# Patient Record
Sex: MALE | Race: WHITE | NOT HISPANIC OR LATINO | Employment: FULL TIME | URBAN - METROPOLITAN AREA
[De-identification: names, ages, dates, MRNs, and addresses within clinical notes are randomized per-mention and may not be internally consistent; named-entity substitution may affect disease eponyms.]

---

## 2017-05-08 ENCOUNTER — OFFICE VISIT (OUTPATIENT)
Dept: FAMILY MEDICINE | Facility: OTHER | Age: 37
End: 2017-05-08
Payer: COMMERCIAL

## 2017-05-08 VITALS
HEIGHT: 73 IN | BODY MASS INDEX: 29.4 KG/M2 | DIASTOLIC BLOOD PRESSURE: 80 MMHG | WEIGHT: 221.8 LBS | HEART RATE: 72 BPM | SYSTOLIC BLOOD PRESSURE: 118 MMHG | RESPIRATION RATE: 18 BRPM | OXYGEN SATURATION: 98 % | TEMPERATURE: 98 F

## 2017-05-08 DIAGNOSIS — M67.972 DISORDER OF LEFT ACHILLES TENDON: ICD-10-CM

## 2017-05-08 DIAGNOSIS — J01.90 ACUTE BACTERIAL RHINOSINUSITIS: Primary | ICD-10-CM

## 2017-05-08 DIAGNOSIS — B96.89 ACUTE BACTERIAL RHINOSINUSITIS: Primary | ICD-10-CM

## 2017-05-08 PROCEDURE — 99214 OFFICE O/P EST MOD 30 MIN: CPT | Performed by: NURSE PRACTITIONER

## 2017-05-08 RX ORDER — AZITHROMYCIN 250 MG/1
TABLET, FILM COATED ORAL
Qty: 6 TABLET | Refills: 0 | Status: SHIPPED | OUTPATIENT
Start: 2017-05-08 | End: 2018-03-09

## 2017-05-08 NOTE — NURSING NOTE
"Chief Complaint   Patient presents with     URI     Cough       Initial /80 (BP Location: Right arm, Patient Position: Chair, Cuff Size: Adult Regular)  Pulse 72  Temp 98  F (36.7  C) (Oral)  Resp 18  Ht 6' 1.2\" (1.859 m)  Wt 221 lb 12.8 oz (100.6 kg)  SpO2 98%  BMI 29.1 kg/m2 Estimated body mass index is 29.1 kg/(m^2) as calculated from the following:    Height as of this encounter: 6' 1.2\" (1.859 m).    Weight as of this encounter: 221 lb 12.8 oz (100.6 kg).  Medication Reconciliation: complete    "

## 2017-05-08 NOTE — PATIENT INSTRUCTIONS
"Drink plenty of fluids and rest.  May use salt water gargles- about 8 oz warm water with about 1 teaspoon salt  Sucrets and Cepacol spray are over the counter medications that numb the throat.  Over the counter pain relievers such as tylenol or ibuprofen may be used as needed.   Honey lemon tea helps to soothe the throat. \"Throat Coat\" tea is soothing as well.  Increase humidity, may use vicks vapor rub.  Wash hands frequently and do not share beverages.  Please follow up with primary care provider if symptoms are not improving, worsening or new symptoms or for any adverse reactions to medications.     Thank you  Ofelia Montero CNP      Please contact Dr. Alonzo at [facility].   "

## 2017-05-08 NOTE — PROGRESS NOTES
SUBJECTIVE:                                                    Henry Aaron is a 36 year old male who presents to clinic today for the following health issues:    HPI    Acute Illness   Acute illness concerns: cold symptoms  Onset: last week     Fever: no    Chills/Sweats: no    Headache (location?): YES    Sinus Pressure:YES    Conjunctivitis:  YES    Ear Pain: no    Rhinorrhea: YES    Congestion: YES    Sore Throat: YES     Cough: YES-productive of green sputum (but is red/green color blind)    Wheeze: YES- starting to maybe     Decreased Appetite: no     Nausea: no     Vomiting: no     Diarrhea:  no     Dysuria/Freq.: no     Fatigue/Achiness: YES    Sick/Strep Exposure: YES- family at home      Therapies Tried and outcome: Dayquil   Would also like to discuss left heel/ankle pain. 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    ANKLE LEFT THREE OR MORE VIEWS 4/19/2016 10:22 AM      HISTORY: Six months of pain in distal Achilles tendon, feel calcified  in this region. No known injury. Pain in left ankle and joints of left  foot.     COMPARISON: None.     FINDINGS: There is focal soft tissue thickening in the region of the  Achilles tendon/musculotendinous junction concerning for Achilles  tendinopathy. This would be better evaluated with MRI as clinically  indicated. No fracture or malalignment. Joint spaces are grossly well  maintained.         IMPRESSION: Findings are concerning for Achilles tendinopathy. Further  evaluation with MRI may be helpful as clinically indicated.      ALIX DOSS MD        Problem list and histories reviewed & adjusted, as indicated.  Additional history: as documented    Labs reviewed in EPIC    ROS:  Constitutional, HEENT, cardiovascular, pulmonary, gi and gu systems are negative, except as otherwise noted.    OBJECTIVE:                          "                           /80 (BP Location: Right arm, Patient Position: Chair, Cuff Size: Adult Regular)  Pulse 72  Temp 98  F (36.7  C) (Oral)  Resp 18  Ht 6' 1.2\" (1.859 m)  Wt 221 lb 12.8 oz (100.6 kg)  SpO2 98%  BMI 29.1 kg/m2  Body mass index is 29.1 kg/(m^2).  GENERAL: alert, no distress and fatigued  EYES: Eyes grossly normal to inspection, PERRL and conjunctivae and sclerae normal  HENT: normal cephalic/atraumatic, ear canals and TM's normal, nasal mucosa edematous , oropharynx clear, oral mucous membranes moist, tonsillar erythema and sinuses: maxillary tenderness on bilateral  NECK: bilateral anterior cervical adenopathy, no asymmetry, masses, or scars and thyroid normal to palpation  RESP: lungs clear to auscultation - no rales, rhonchi or wheezes  CV: regular rate and rhythm, normal S1 S2, no S3 or S4, no murmur, click or rub, no peripheral edema and peripheral pulses strong  ABDOMEN: soft, nontender, no hepatosplenomegaly, no masses and bowel sounds normal  MS: no gross musculoskeletal defects noted, no edema  MS: normal gait, no edema, inflammation, painless ROM. Positive for tightness, palpable bump right lateral distal achilles.     Diagnostic Test Results:  none      ASSESSMENT/PLAN:                                                        1. Acute bacterial rhinosinusitis  Will continue home care and start abx given length of illness and worsening symptoms. Will take abx with yogurt to protect good bacteria in colon.     - azithromycin (ZITHROMAX) 250 MG tablet; Two tablets first day, then one tablet daily for four days.  Dispense: 6 tablet; Refill: 0    2. Disorder of left Achilles tendon  Discussed  plan including further evaluation with MRI as recommended by radiologist (see X-ray results above) and concerns for achilles tendinopathy placing him at increased risk of injury.  Treatment options include following up with orthopedic specialty, active release technique, and strengthening " of supporting muscles of ankle.   He will call insurance company to find out coverage as this is a concern for him. He may not opt to have MRI done due to cost.   Information given with chiropractic referral to specialist who does active release technique.   If he would like to see orthopedic specialty he will call clinic for referral as this is appropriate.   I will contact him with MRI results when available.   - MR Ankle Left w/o Contrast; Future  - CHIROPRACTIC REFERRAL    Will return to clinic if symptoms worsen or do not improve with treatment.     The patient indicates understanding of these issues and agrees with the plan.      See Patient Instructions    GRACE [facility]

## 2017-05-08 NOTE — MR AVS SNAPSHOT
"              After Visit Summary   5/8/2017    Henry Aaron    MRN: [id number]           Patient Information     Date Of Birth          1980        Visit Information        Provider Department      5/8/2017 8:30 AM Ofelia Montero APRN [facility]        Today's Diagnoses     Acute bacterial rhinosinusitis    -  1    Disorder of left Achilles tendon          Care Instructions    Drink plenty of fluids and rest.  May use salt water gargles- about 8 oz warm water with about 1 teaspoon salt  Sucrets and Cepacol spray are over the counter medications that numb the throat.  Over the counter pain relievers such as tylenol or ibuprofen may be used as needed.   Honey lemon tea helps to soothe the throat. \"Throat Coat\" tea is soothing as well.  Increase humidity, may use vicks vapor rub.  Wash hands frequently and do not share beverages.  Please follow up with primary care provider if symptoms are not improving, worsening or new symptoms or for any adverse reactions to medications.     Thank you  Ofelia Montero CNP      Please contact Dr. Alonzo at [facility].         Follow-ups after your visit        Additional Services     CHIROPRACTIC REFERRAL       Your provider has referred you to: Dr. Vishnu Alonzo [facility] for active release technique    Please be aware that coverage of these services is subject to the terms and limitations of your health insurance plan.  Call member services at your health plan with any benefit or coverage questions.      Please bring the following to your appointment:    >>   Any x-rays, CTs or MRIs which have been performed.  Contact the facility where they were done to arrange for  prior to your scheduled appointment.    >>   List of current medications   >>   This referral request   >>   Any documents/labs given to you for this referral                  Future tests that were ordered for you today     Open Future Orders        " "Priority Expected Expires Ordered    MR Ankle Left w/o Contrast Routine  5/8/2018 5/8/2017            Who to contact     If you have questions or need follow up information about today's clinic visit or your schedule please contact [facility] directly at [phone].  Normal or non-critical lab and imaging results will be communicated to you by MyChart, letter or phone within 4 business days after the clinic has received the results. If you do not hear from us within 7 days, please contact the clinic through [employer] or phone. If you have a critical or abnormal lab result, we will notify you by phone as soon as possible.  Submit refill requests through [employer] or call your pharmacy and they will forward the refill request to us. Please allow 3 business days for your refill to be completed.          Additional Information About Your Visit        MyChart Information     [employer] gives you secure access to your electronic health record. If you see a primary care provider, you can also send messages to your care team and make appointments. If you have questions, please call your primary care clinic.  If you do not have a primary care provider, please call [phone] and they will assist you.        Care EveryWhere ID     This is your Care EveryWhere ID. This could be used by other organizations to access your [place] medical records  [id number]        Your Vitals Were     Pulse Temperature Respirations Height Pulse Oximetry BMI (Body Mass Index)    72 98  F (36.7  C) (Oral) 18 6' 1.2\" (1.859 m) 98% 29.1 kg/m2       Blood Pressure from Last 3 Encounters:   05/08/17 118/80   04/19/16 126/88   11/12/14 122/60    Weight from Last 3 Encounters:   05/08/17 221 lb 12.8 oz (100.6 kg)   11/12/14 222 lb (100.7 kg)   11/26/13 221 lb (100.2 kg)              We Performed the Following     CHIROPRACTIC REFERRAL          Today's Medication Changes          These changes are accurate as of: 5/8/17  9:13 AM.  If you " have any questions, ask your nurse or doctor.               Start taking these medicines.        Dose/Directions    azithromycin 250 MG tablet   Commonly known as:  ZITHROMAX   Used for:  Acute bacterial rhinosinusitis   Started by:  Ofelia Montero APRN CNP        Two tablets first day, then one tablet daily for four days.   Quantity:  6 tablet   Refills:  0            Where to get your medicines      These medications were sent to [id number] - [facility] [address] [address] AT [facility] [address]  [address] [address], [facility] [address]     Phone:  [phone]     azithromycin 250 MG tablet                Primary Care Provider Office Phone # Fax #    GRACE Weston [phone]       [address]  [facility] [address]        Thank you!     Thank you for choosing [facility]  for your care. Our goal is always to provide you with excellent care. Hearing back from our patients is one way we can continue to improve our services. Please take a few minutes to complete the written survey that you may receive in the mail after your visit with us. Thank you!             Your Updated Medication List - Protect others around you: Learn how to safely use, store and throw away your medicines at www.disposemymeds.org.          This list is accurate as of: 5/8/17  9:13 AM.  Always use your most recent med list.                   Brand Name Dispense Instructions for use    azithromycin 250 MG tablet    ZITHROMAX    6 tablet    Two tablets first day, then one tablet daily for four days.

## 2018-02-05 ENCOUNTER — TRANSFERRED RECORDS (OUTPATIENT)
Dept: HEALTH INFORMATION MANAGEMENT | Facility: CLINIC | Age: 38
End: 2018-02-05

## 2018-02-05 LAB
ALT SERPL-CCNC: 143 U/L (ref 0–45)
AST SERPL-CCNC: 72 U/L (ref 0–41)
CHOLEST SERPL-MCNC: 204 MG/DL (ref 140–280)
CREAT SERPL-MCNC: 0.86 MG/DL (ref 0.6–1.5)
GLUCOSE SERPL-MCNC: 60 MG/DL (ref 70–109)
HBA1C MFR BLD: 5.2 % (ref 3–6)
HDLC SERPL-MCNC: 57.9 MG/DL (ref 25–75)
HEP C HIM: NORMAL
LDLC SERPL CALC-MCNC: 121 MG/DL (ref 80–200)
TRIGL SERPL-MCNC: 125 MG/DL (ref 10–200)

## 2018-03-06 NOTE — PATIENT INSTRUCTIONS
Preventive Health Recommendations  Male Ages 26 - 39    Yearly exam:             See your health care provider every year in order to  o   Review health changes.   o   Discuss preventive care.    o   Review your medicines if your doctor has prescribed any.    You should be tested each year for STDs (sexually transmitted diseases), if you re at risk.     After age 35, talk to your provider about cholesterol testing. If you are at risk for heart disease, have your cholesterol tested at least every 5 years.     If you are at risk for diabetes, you should have a diabetes test (fasting glucose).  Shots: Get a flu shot each year. Get a tetanus shot every 10 years.     Nutrition:    Eat at least 5 servings of fruits and vegetables daily.     Eat whole-grain bread, whole-wheat pasta and brown rice instead of white grains and rice.     Talk to your provider about Calcium and Vitamin D.     Lifestyle    Exercise for at least 150 minutes a week (30 minutes a day, 5 days a week). This will help you control your weight and prevent disease.     Limit alcohol to one drink per day.     No smoking.     Wear sunscreen to prevent skin cancer.     See your dentist every six months for an exam and cleaning.     These are new changes to your current plan of care based on today's visit:    Medications stopped    Medications to be started    Change dose of this medication    New treatments        Follow up appointments:    1.  FOLLOW UP WITH YOUR PRIMARY CARE PROVIDER: As needed    2. FOLLOW UP WITH SPECIALIST:     3. FOLLOW UP WITH NURSE VISIT:     4. IMAGING STUDIES TO BE SCHEDULED: Ultrasound of the liver    Vishnu Toribio MD

## 2018-03-06 NOTE — PROGRESS NOTES
SUBJECTIVE:   CC: Henry Aaron is an 37 year old male who presents for preventative health visit.     Physical   Annual:     Getting at least 3 servings of Calcium per day::  Yes    Bi-annual eye exam::  Yes    Dental care twice a year::  NO    Sleep apnea or symptoms of sleep apnea::  None    Diet::  Regular (no restrictions)    Frequency of exercise::  None    Taking medications regularly::  Not Applicable    Medication side effects::  Not applicable    Additional concerns today::  YES          1) Review outside lab results --- will need recent insurance application fasting blood studies abstracted into his chart.    This patient recently had fasting blood studies done on 2/8/18 for life insurance application.  He had normal findings except for an unanticipated elevation of AST of 72, ALT of 143 and GGTP of 113.  Bilirubin was not measured.  This was totally unexpected.  The patient had been an active alcohol user up until 3-4 years ago.  Prior to that he may have had abnormal liver enzymes from the amount of drinking he was doing.  He had 2 DUIs in the past.    He is feeling absolutely fine.  There is no symptoms of nausea, vomiting, stool changes, abdominal distention or fatigue.  He has had no yellowing of his eyes.    The screening study showed that he was nonreactive for hepatitis C and negative for hepatitis B antigen.  HIV was also nonreactive.  He had negative findings for cocaine and nicotine.  Blood sugars were normal.  His lipid panel were quite acceptable.    He does not use alcoholic beverages as mentioned.  No illicit drugs.  He does frequently use energy drinks (contents and additives uncertain.)  He does state that he drinks a good deal of diet sodas.  He takes no herbal medicines are nonprescription supplements and he has had no recent travel outside the [place].  Is on no regular medications.  He has not had any illnesses.              Today's PHQ-2 Score:   PHQ-2 ( 1999 Pf[employer]) 3/9/2018  "  Q1: Little interest or pleasure in doing things 0   Q2: Feeling down, depressed or hopeless 0   PHQ-2 Score 0   Q1: Little interest or pleasure in doing things Not at all   Q2: Feeling down, depressed or hopeless Not at all   PHQ-2 Score 0       Abuse: Current or Past(Physical, Sexual or Emotional)- No  Do you feel safe in your environment - Yes    Social History   Substance Use Topics     Smoking status: Former Smoker     Types: Cigarettes     Smokeless tobacco: Never Used      Comment: 0-5 cigs/day since 1998, on and off (has quit for up to three years at one point)     Alcohol use Yes      Comment: 5 drinks a week     No flowsheet data found.    Last PSA: No results found for: PSA    Reviewed orders with patient. Reviewed health maintenance and updated orders accordingly - Yes      Reviewed and updated as needed this visit by clinical staff  Tobacco  Allergies  Med Hx  Surg Hx  Fam Hx  Soc Hx        Reviewed and updated as needed this visit by Provider            Review of Systems  C: NEGATIVE for fever, chills, change in weight  I: NEGATIVE for worrisome rashes, moles or lesions  E: NEGATIVE for vision changes or irritation  ENT: NEGATIVE for ear, mouth and throat problems  R: NEGATIVE for significant cough or SOB  B: NEGATIVE for masses, tenderness or discharge  CV: NEGATIVE for chest pain, palpitations or peripheral edema  GI: NEGATIVE for nausea, abdominal pain, heartburn, or change in bowel habits   male: negative for dysuria, hematuria, decreased urinary stream, erectile dysfunction, urethral discharge  M: NEGATIVE for significant arthralgias or myalgia  N: NEGATIVE for weakness, dizziness or paresthesias  E: NEGATIVE for temperature intolerance, skin/hair changes  H: NEGATIVE for bleeding problems  P: NEGATIVE for changes in mood or affect    OBJECTIVE:   /84  Pulse 80  Temp 98.2  F (36.8  C) (Temporal)  Resp 14  Ht 6' 0.25\" (1.835 m)  Wt 214 lb (97.1 kg)  BMI 28.82 kg/m2    Physical " Exam  GENERAL: healthy, alert and no distress  EYES: Eyes grossly normal to inspection, PERRL and conjunctivae and sclerae normal  HENT: ear canals and TM's normal, nose and mouth without ulcers or lesions  NECK: no adenopathy, no asymmetry, masses, or scars and thyroid normal to palpation  RESP: lungs clear to auscultation - no rales, rhonchi or wheezes  BREAST: normal without masses, tenderness or nipple discharge and no palpable axillary masses or adenopathy  CV: regular rate and rhythm, normal S1 S2, no S3 or S4, no murmur, click or rub, no peripheral edema and peripheral pulses strong  ABDOMEN: soft, nontender, no hepatosplenomegaly, no masses and bowel sounds normal   (male): normal male genitalia without lesions or urethral discharge, no hernia  RECTAL: deferred  MS: no gross musculoskeletal defects noted, no edema  SKIN: no suspicious lesions or rashes  NEURO: Normal strength and tone, mentation intact and speech normal  PSYCH: mentation appears normal, affect normal/bright  LYMPH: no cervical, supraclavicular, axillary, or inguinal adenopathy    Results for orders placed or performed in visit on 03/09/18 (from the past 24 hour(s))   CBC with platelets   Result Value Ref Range    WBC 4.6 4.0 - 11.0 10e9/L    RBC Count 5.51 4.4 - 5.9 10e12/L    Hemoglobin 15.7 13.3 - 17.7 g/dL    Hematocrit 47.0 40.0 - 53.0 %    MCV 85 78 - 100 fl    MCH 28.5 26.5 - 33.0 pg    MCHC 33.4 31.5 - 36.5 g/dL    RDW 13.9 10.0 - 15.0 %    Platelet Count 230 150 - 450 10e9/L   Lipid panel reflex to direct LDL Fasting   Result Value Ref Range    Cholesterol 191 <200 mg/dL    Triglycerides 86 <150 mg/dL    HDL Cholesterol 64 >39 mg/dL    LDL Cholesterol Calculated 110 (H) <100 mg/dL    Non HDL Cholesterol 127 <130 mg/dL   Basic metabolic panel   Result Value Ref Range    Sodium 141 133 - 144 mmol/L    Potassium 4.5 3.4 - 5.3 mmol/L    Chloride 105 94 - 109 mmol/L    Carbon Dioxide 28 20 - 32 mmol/L    Anion Gap 8 3 - 14 mmol/L     Glucose 87 70 - 99 mg/dL    Urea Nitrogen 21 7 - 30 mg/dL    Creatinine 0.79 0.66 - 1.25 mg/dL    GFR Estimate >90 >60 mL/min/1.7m2    GFR Estimate If Black >90 >60 mL/min/1.7m2    Calcium 9.2 8.5 - 10.1 mg/dL   Hepatic panel (Albumin, ALT, AST, Bili, Alk Phos, TP)   Result Value Ref Range    Bilirubin Direct 0.1 0.0 - 0.2 mg/dL    Bilirubin Total 0.6 0.2 - 1.3 mg/dL    Albumin 4.1 3.4 - 5.0 g/dL    Protein Total 8.0 6.8 - 8.8 g/dL    Alkaline Phosphatase 69 40 - 150 U/L     (H) 0 - 70 U/L    AST 33 0 - 45 U/L   GGT   Result Value Ref Range     (H) 0 - 75 U/L     Us Abdomen Limited    Result Date: 3/9/2018  Right upper quadrant ultrasound Comparisons: 4/26/2010 HISTORY:    Elevated liver enzymes FINDINGS:    The liver measures a craniocaudal dimension of 14.6 cm. No focal liver lesion. Liver echogenicity is normal. The gallbladder is normal. There is no gallbladder wall thickening or pericholecystic fluid. No sonographic Joy's sign was elicited.  The diameter of the common bile duct measures 4mm. The pancreas is partially obscured but otherwise appears unremarkable. The aorta and IVC are visualized. The right kidney measures 12.8 cm long. No solid renal mass, stone or hydronephrosis.     IMPRESSION:    Normal right upper quadrant ultrasound. LELA RANDHAWA MD      ASSESSMENT/PLAN:   1. Routine general medical examination at a health care facility  General physical examination appears unremarkable and normal.  - OFFICE/OUTPT VISIT,EST,LEVL II    2. Abnormal liver enzymes  Etiology of the elevation uncertain.  His AST is now normalized and ALT is improved from 1 month ago but still elevated.  GGT still elevated past 2 times normal.  Limited ultrasound of the upper abdomen shows a normal-appearing liver with gallbladder being normal and bile ducts being normal.  2 additional tests for possible autoimmune hepatitis are pending.  Hepatitis C and B were both negative from his life insurance  "studies.  Will discuss with the patient and either refer for gastroenterology at this time but more than likely continue to monitor.  Likely have him discontinue energy drinks completely case there is some type of supplement that is potentially liver toxic.  Would follow-up blood studies in 2-3 weeks.  - CBC with platelets  - Basic metabolic panel  - Hepatic panel (Albumin, ALT, AST, Bili, Alk Phos, TP)  - GGT  - F Actin EIA with reflex  - Mitochondrial M2 Antibody IgG  - US Abdomen Limited; Future  - PREVENTIVE VISIT,EST,18-39    3. Hyperlipidemia LDL goal <160  Lipid panel is at goal and no intervention other than healthy diet recommendation.  - Lipid panel reflex to direct LDL Fasting    COUNSELING:   Reviewed preventive health counseling, as reflected in patient instructions  Special attention given to:        Regular exercise       Healthy diet/nutrition    BP Screening:   Last 3 BP Readings:    BP Readings from Last 3 Encounters:   03/09/18 120/84   05/08/17 118/80   04/19/16 126/88       The following was recommended to the patient:  Re-screen BP within a year and recommended lifestyle modifications     reports that he has quit smoking. His smoking use included Cigarettes. He has never used smokeless tobacco.    Estimated body mass index is 28.82 kg/(m^2) as calculated from the following:    Height as of this encounter: 6' 0.25\" (1.835 m).    Weight as of this encounter: 214 lb (97.1 kg).   Weight management plan: Discussed healthy diet and exercise guidelines and patient will follow up in 12 months in clinic to re-evaluate.    Counseling Resources:  ATP IV Guidelines  Pooled Cohorts Equation Calculator  FRAX Risk Assessment  ICSI Preventive Guidelines  Dietary Guidelines for Americans, 2010  USDA's MyPlate  ASA Prophylaxis  Lung CA Screening    Vishnu Toribio MD  [facility]  "

## 2018-03-09 ENCOUNTER — RADIANT APPOINTMENT (OUTPATIENT)
Dept: ULTRASOUND IMAGING | Facility: CLINIC | Age: 38
End: 2018-03-09
Attending: FAMILY MEDICINE
Payer: COMMERCIAL

## 2018-03-09 ENCOUNTER — OFFICE VISIT (OUTPATIENT)
Dept: FAMILY MEDICINE | Facility: CLINIC | Age: 38
End: 2018-03-09
Payer: COMMERCIAL

## 2018-03-09 VITALS
HEART RATE: 80 BPM | RESPIRATION RATE: 14 BRPM | HEIGHT: 72 IN | SYSTOLIC BLOOD PRESSURE: 120 MMHG | TEMPERATURE: 98.2 F | DIASTOLIC BLOOD PRESSURE: 84 MMHG | BODY MASS INDEX: 28.99 KG/M2 | WEIGHT: 214 LBS

## 2018-03-09 DIAGNOSIS — R74.8 ABNORMAL LIVER ENZYMES: ICD-10-CM

## 2018-03-09 DIAGNOSIS — E78.5 HYPERLIPIDEMIA LDL GOAL <160: ICD-10-CM

## 2018-03-09 DIAGNOSIS — Z00.00 ROUTINE GENERAL MEDICAL EXAMINATION AT A HEALTH CARE FACILITY: Primary | ICD-10-CM

## 2018-03-09 LAB
ALBUMIN SERPL-MCNC: 4.1 G/DL (ref 3.4–5)
ALP SERPL-CCNC: 69 U/L (ref 40–150)
ALT SERPL W P-5'-P-CCNC: 109 U/L (ref 0–70)
ANION GAP SERPL CALCULATED.3IONS-SCNC: 8 MMOL/L (ref 3–14)
AST SERPL W P-5'-P-CCNC: 33 U/L (ref 0–45)
BILIRUB DIRECT SERPL-MCNC: 0.1 MG/DL (ref 0–0.2)
BILIRUB SERPL-MCNC: 0.6 MG/DL (ref 0.2–1.3)
BUN SERPL-MCNC: 21 MG/DL (ref 7–30)
CALCIUM SERPL-MCNC: 9.2 MG/DL (ref 8.5–10.1)
CHLORIDE SERPL-SCNC: 105 MMOL/L (ref 94–109)
CHOLEST SERPL-MCNC: 191 MG/DL
CO2 SERPL-SCNC: 28 MMOL/L (ref 20–32)
CREAT SERPL-MCNC: 0.79 MG/DL (ref 0.66–1.25)
ERYTHROCYTE [DISTWIDTH] IN BLOOD BY AUTOMATED COUNT: 13.9 % (ref 10–15)
GFR SERPL CREATININE-BSD FRML MDRD: >90 ML/MIN/1.7M2
GGT SERPL-CCNC: 160 U/L (ref 0–75)
GLUCOSE SERPL-MCNC: 87 MG/DL (ref 70–99)
HCT VFR BLD AUTO: 47 % (ref 40–53)
HDLC SERPL-MCNC: 64 MG/DL
HGB BLD-MCNC: 15.7 G/DL (ref 13.3–17.7)
LDLC SERPL CALC-MCNC: 110 MG/DL
MCH RBC QN AUTO: 28.5 PG (ref 26.5–33)
MCHC RBC AUTO-ENTMCNC: 33.4 G/DL (ref 31.5–36.5)
MCV RBC AUTO: 85 FL (ref 78–100)
NONHDLC SERPL-MCNC: 127 MG/DL
PLATELET # BLD AUTO: 230 10E9/L (ref 150–450)
POTASSIUM SERPL-SCNC: 4.5 MMOL/L (ref 3.4–5.3)
PROT SERPL-MCNC: 8 G/DL (ref 6.8–8.8)
RBC # BLD AUTO: 5.51 10E12/L (ref 4.4–5.9)
SODIUM SERPL-SCNC: 141 MMOL/L (ref 133–144)
TRIGL SERPL-MCNC: 86 MG/DL
WBC # BLD AUTO: 4.6 10E9/L (ref 4–11)

## 2018-03-09 PROCEDURE — 99395 PREV VISIT EST AGE 18-39: CPT | Performed by: FAMILY MEDICINE

## 2018-03-09 PROCEDURE — 80076 HEPATIC FUNCTION PANEL: CPT | Performed by: FAMILY MEDICINE

## 2018-03-09 PROCEDURE — 83516 IMMUNOASSAY NONANTIBODY: CPT | Mod: 90 | Performed by: FAMILY MEDICINE

## 2018-03-09 PROCEDURE — 80048 BASIC METABOLIC PNL TOTAL CA: CPT | Performed by: FAMILY MEDICINE

## 2018-03-09 PROCEDURE — 76705 ECHO EXAM OF ABDOMEN: CPT

## 2018-03-09 PROCEDURE — 99000 SPECIMEN HANDLING OFFICE-LAB: CPT | Performed by: FAMILY MEDICINE

## 2018-03-09 PROCEDURE — 99213 OFFICE O/P EST LOW 20 MIN: CPT | Mod: 25 | Performed by: FAMILY MEDICINE

## 2018-03-09 PROCEDURE — 36415 COLL VENOUS BLD VENIPUNCTURE: CPT | Performed by: FAMILY MEDICINE

## 2018-03-09 PROCEDURE — 80061 LIPID PANEL: CPT | Performed by: FAMILY MEDICINE

## 2018-03-09 PROCEDURE — 83516 IMMUNOASSAY NONANTIBODY: CPT | Mod: 59 | Performed by: FAMILY MEDICINE

## 2018-03-09 PROCEDURE — 85027 COMPLETE CBC AUTOMATED: CPT | Performed by: FAMILY MEDICINE

## 2018-03-09 PROCEDURE — 82977 ASSAY OF GGT: CPT | Performed by: FAMILY MEDICINE

## 2018-03-09 ASSESSMENT — PAIN SCALES - GENERAL: PAINLEVEL: NO PAIN (0)

## 2018-03-09 NOTE — MR AVS SNAPSHOT
After Visit Summary   3/9/2018    Henry Aaron    MRN: [id number]           Patient Information     Date Of Birth          1980        Visit Information        Provider Department      3/9/2018 8:20 AM Vishnu Toribio MD [facility]        Today's Diagnoses     Routine general medical examination at a health care facility    -  1    Abnormal liver enzymes        Hyperlipidemia LDL goal <160          Care Instructions      Preventive Health Recommendations  Male Ages 26 - 39    Yearly exam:             See your health care provider every year in order to  o   Review health changes.   o   Discuss preventive care.    o   Review your medicines if your doctor has prescribed any.    You should be tested each year for STDs (sexually transmitted diseases), if you re at risk.     After age 35, talk to your provider about cholesterol testing. If you are at risk for heart disease, have your cholesterol tested at least every 5 years.     If you are at risk for diabetes, you should have a diabetes test (fasting glucose).  Shots: Get a flu shot each year. Get a tetanus shot every 10 years.     Nutrition:    Eat at least 5 servings of fruits and vegetables daily.     Eat whole-grain bread, whole-wheat pasta and brown rice instead of white grains and rice.     Talk to your provider about Calcium and Vitamin D.     Lifestyle    Exercise for at least 150 minutes a week (30 minutes a day, 5 days a week). This will help you control your weight and prevent disease.     Limit alcohol to one drink per day.     No smoking.     Wear sunscreen to prevent skin cancer.     See your dentist every six months for an exam and cleaning.     These are new changes to your current plan of care based on today's visit:    Medications stopped    Medications to be started    Change dose of this medication    New treatments        Follow up appointments:    1.  FOLLOW UP WITH YOUR PRIMARY CARE PROVIDER: As needed    2.  FOLLOW UP WITH SPECIALIST:     3. FOLLOW UP WITH NURSE VISIT:     4. IMAGING STUDIES TO BE SCHEDULED: Ultrasound of the liver    Vishnu Toribio MD                  Follow-ups after your visit        Your next 10 appointments already scheduled     Mar 09, 2018 12:45 PM CST   US ABDOMEN LIMITED with MGUS1, MG US TECH   [facility] ([facility])    [address] [place] [id number] [phone]           Please bring a list of your medicines (including vitamins, minerals and over-the-counter drugs). Also, tell your doctor about any allergies you may have. Wear comfortable clothes and leave your valuables at home.  Adults: No eating or drinking for 8 hours before the exam. You may take medicine with a small sip of water.  Children: - Children 6+ years: No food or drink for 6 hours before exam. - Children 1-5 years: No food or drink for 4 hours before exam. - Infants, breast-fed: may have breast milk up to 2 hours before exam. - Infants, formula: may have bottle until 4 hours before exam.  Please call the Imaging Department at your exam site with any questions.              Future tests that were ordered for you today     Open Future Orders        Priority Expected Expires Ordered    US Abdomen Limited Routine 3/9/2018 4/30/2018 3/9/2018            Who to contact     If you have questions or need follow up information about today's clinic visit or your schedule please contact [facility] (formerly [facility]) directly at [phone].  Normal or non-critical lab and imaging results will be communicated to you by MyChart, letter or phone within 4 business days after the clinic has received the results. If you do not hear from us within 7 days, please contact the clinic through MyChart or phone. If you have a critical or abnormal lab result, we will notify you by phone as soon as possible.  Submit refill requests through [employer] or call your pharmacy and they will forward the refill  "request to us. Please allow 3 business days for your refill to be completed.          Additional Information About Your Visit        [employer] Information     [employer] gives you secure access to your electronic health record. If you see a primary care provider, you can also send messages to your care team and make appointments. If you have questions, please call your primary care clinic.  If you do not have a primary care provider, please call [phone] and they will assist you.        Care EveryWhere ID     This is your Care EveryWhere ID. This could be used by other organizations to access your [facility] medical records  [id number]        Your Vitals Were     Pulse Temperature Respirations Height BMI (Body Mass Index)       80 98.2  F (36.8  C) (Temporal) 14 6' 0.25\" (1.835 m) 28.82 kg/m2        Blood Pressure from Last 3 Encounters:   03/09/18 120/84   05/08/17 118/80   04/19/16 126/88    Weight from Last 3 Encounters:   03/09/18 214 lb (97.1 kg)   05/08/17 221 lb 12.8 oz (100.6 kg)   11/12/14 222 lb (100.7 kg)              We Performed the Following     Basic metabolic panel     CBC with platelets     F Actin EIA with reflex     GGT     Hepatic panel (Albumin, ALT, AST, Bili, Alk Phos, TP)     Lipid panel reflex to direct LDL Fasting     Mitochondrial M2 Antibody IgG        Primary Care Provider Office Phone # Fax #    Ofelianael Harper Paul Montero, APRN [phone]       [address]  [facility] [address]        Equal Access to Services     JHON LAZO AH: Hadii aad ku hadasho Soomaali, waaxda luqadaha, qaybta kaalmada adeegyada, waxay clair ascencio . So [facility] [phone].    ATENCIÓN: Si habla español, tiene a lu disposición servicios gratuitos de asistencia lingüística. Llame al [phone].    We comply with applicable federal civil rights laws and [place] laws. We do not discriminate on the basis of race, color, national origin, age, disability, sex, sexual " orientation, or gender identity.            Thank you!     Thank you for choosing [facility]  for your care. Our goal is always to provide you with excellent care. Hearing back from our patients is one way we can continue to improve our services. Please take a few minutes to complete the written survey that you may receive in the mail after your visit with us. Thank you!             Your Updated Medication List - Protect others around you: Learn how to safely use, store and throw away your medicines at www.disposemymeds.org.      Notice  As of 3/9/2018  9:10 AM    You have not been prescribed any medications.

## 2018-03-10 LAB — MITOCHONDRIA M2 IGG SER-ACNC: 1 U/ML

## 2018-03-11 LAB — SMA IGG SER-ACNC: 12 UNITS (ref 0–19)

## 2018-04-11 ENCOUNTER — TELEPHONE (OUTPATIENT)
Dept: LAB | Facility: CLINIC | Age: 38
End: 2018-04-11

## 2018-04-11 DIAGNOSIS — R74.8 ABNORMAL LIVER ENZYMES: Primary | ICD-10-CM

## 2018-04-11 NOTE — TELEPHONE ENCOUNTER
peter is coming in for liver panel on 4/13/18  Please place orders as needed.  Thank you   Eunice CADENA) [facility]

## 2018-04-13 DIAGNOSIS — R74.8 ABNORMAL LIVER ENZYMES: ICD-10-CM

## 2018-04-13 LAB
ALBUMIN SERPL-MCNC: 3.9 G/DL (ref 3.4–5)
ALP SERPL-CCNC: 69 U/L (ref 40–150)
ALT SERPL W P-5'-P-CCNC: 109 U/L (ref 0–70)
AST SERPL W P-5'-P-CCNC: 37 U/L (ref 0–45)
BILIRUB DIRECT SERPL-MCNC: 0.1 MG/DL (ref 0–0.2)
BILIRUB SERPL-MCNC: 0.6 MG/DL (ref 0.2–1.3)
PROT SERPL-MCNC: 7.6 G/DL (ref 6.8–8.8)

## 2018-04-13 PROCEDURE — 80076 HEPATIC FUNCTION PANEL: CPT | Performed by: FAMILY MEDICINE

## 2018-04-13 PROCEDURE — 36415 COLL VENOUS BLD VENIPUNCTURE: CPT | Performed by: FAMILY MEDICINE

## 2018-04-16 ENCOUNTER — TELEPHONE (OUTPATIENT)
Dept: FAMILY MEDICINE | Facility: CLINIC | Age: 38
End: 2018-04-16

## 2018-04-16 DIAGNOSIS — R74.8 ABNORMAL LIVER ENZYMES: Primary | ICD-10-CM

## 2018-04-16 NOTE — TELEPHONE ENCOUNTER
The patient was called to discuss the repeat hepatic panel.  He still has an elevated ALT of 109, identical to 1 month ago.  AST remained stable and normal over the last 2 blood tests.    GGT which was elevated on his insurance application remain at a similar levels at 160.    His evaluation has included a normal liver ultrasound, hepatitis B and C were ruled out with his insurance physical as well as HIV, autoimmune hepatitis was screened for and was also normal.  Hemochromatosis was also evaluated and ruled out.    He has not had an alcoholic beverage for virtually 4 years or more.  He has avoided all energy drinks and unusual foods.  He did have some problem with alcohol but was a number of years ago and has no longer been using any alcoholic beverages.    He did have normal liver enzymes in September 2011.  Initial finding of the abnormal liver enzymes occurred during the insurance physical.    Plan at this time is for gastroenterology consultation.  Will suggest seeing .  Does not need to be urgent.    If his appointment does not occur within the next month, he will return for a fasting hepatic panel and GGT prior to the appointment.  If they return to normal he will be able to cancel that appointment.     to assist in scheduling.  The patient states that Mondays are better days for him for appointments.  To call him back with information on the appointment.    Records will need to be sent to the gastroenterologist to help outline the history and findings.    Vishnu Toribio MD  Please close encounter when call/work completed.

## 2018-04-16 NOTE — TELEPHONE ENCOUNTER
Dr. Neri's office requests OV notes, referral and labs be faxed to them at [phone] to review and they will call the patient to schedule the appt.    Noted on the fax cover sheet that they need to call pt asap and faxed info to them.    Pt informed to expect a call from their office.

## 2018-04-25 ENCOUNTER — TELEPHONE (OUTPATIENT)
Dept: FAMILY MEDICINE | Facility: CLINIC | Age: 38
End: 2018-04-25

## 2018-04-25 NOTE — TELEPHONE ENCOUNTER
Reason for Call:  Other family history     Detailed comments: Pt recently learned that he has a family history of Porphyria (sp?). He is wondering if that may be why he has the unexplained elevated liver enzymes? Please call.     Phone Number Patient can be reached at: Home number on file [phone] (home)    Best Time: any     Can we leave a detailed message on this number? YES    Call taken on 4/25/2018 at 1:34 PM by Theresa العراقي

## 2018-04-26 NOTE — TELEPHONE ENCOUNTER
Pt informed. Pt states he has not been contacted by Dr Neri's office yet. Gave him her contact information.    Ludy Haney, RN, BSN

## 2018-04-26 NOTE — TELEPHONE ENCOUNTER
After brief review of the literature, I would not expect a genetic background for porphyria to cause liver enzyme changes.  Is one had severe symptoms that possibly could occur.    Vishnu Toribio MD    Please close encounter when call/work completed.

## 2018-08-14 ENCOUNTER — TRANSFERRED RECORDS (OUTPATIENT)
Dept: HEALTH INFORMATION MANAGEMENT | Facility: CLINIC | Age: 38
End: 2018-08-14

## 2018-08-23 ENCOUNTER — TRANSFERRED RECORDS (OUTPATIENT)
Dept: HEALTH INFORMATION MANAGEMENT | Facility: CLINIC | Age: 38
End: 2018-08-23

## 2019-08-09 ENCOUNTER — APPOINTMENT (OUTPATIENT)
Dept: GENERAL RADIOLOGY | Facility: CLINIC | Age: 39
End: 2019-08-09
Attending: PHYSICIAN ASSISTANT
Payer: COMMERCIAL

## 2019-08-09 ENCOUNTER — HOSPITAL ENCOUNTER (EMERGENCY)
Facility: CLINIC | Age: 39
Discharge: HOME | End: 2019-08-09
Attending: PHYSICIAN ASSISTANT | Admitting: PHYSICIAN ASSISTANT
Payer: COMMERCIAL

## 2019-08-09 VITALS
OXYGEN SATURATION: 98 % | TEMPERATURE: 98.5 F | RESPIRATION RATE: 17 BRPM | BODY MASS INDEX: 29.82 KG/M2 | HEIGHT: 73 IN | HEART RATE: 93 BPM | WEIGHT: 225 LBS | SYSTOLIC BLOOD PRESSURE: 114 MMHG | DIASTOLIC BLOOD PRESSURE: 85 MMHG

## 2019-08-09 DIAGNOSIS — S61.209A: ICD-10-CM

## 2019-08-09 PROCEDURE — 64450 NJX AA&/STRD OTHER PN/BRANCH: CPT | Performed by: PHYSICIAN ASSISTANT

## 2019-08-09 PROCEDURE — 99284 EMERGENCY DEPT VISIT MOD MDM: CPT | Mod: 25 | Performed by: PHYSICIAN ASSISTANT

## 2019-08-09 PROCEDURE — 90471 IMMUNIZATION ADMIN: CPT | Performed by: PHYSICIAN ASSISTANT

## 2019-08-09 PROCEDURE — 99283 EMERGENCY DEPT VISIT LOW MDM: CPT | Mod: 25 | Performed by: PHYSICIAN ASSISTANT

## 2019-08-09 PROCEDURE — 73140 X-RAY EXAM OF FINGER(S): CPT | Mod: TC,RT

## 2019-08-09 PROCEDURE — 64450 NJX AA&/STRD OTHER PN/BRANCH: CPT | Mod: Z6 | Performed by: PHYSICIAN ASSISTANT

## 2019-08-09 PROCEDURE — 90715 TDAP VACCINE 7 YRS/> IM: CPT | Performed by: PHYSICIAN ASSISTANT

## 2019-08-09 PROCEDURE — 25000128 H RX IP 250 OP 636: Performed by: PHYSICIAN ASSISTANT

## 2019-08-09 RX ORDER — BUPIVACAINE HYDROCHLORIDE 5 MG/ML
1 INJECTION, SOLUTION EPIDURAL; INTRACAUDAL ONCE
Status: COMPLETED | OUTPATIENT
Start: 2019-08-09 | End: 2019-08-09

## 2019-08-09 RX ORDER — CEPHALEXIN 500 MG/1
500 CAPSULE ORAL 4 TIMES DAILY
Qty: 40 CAPSULE | Refills: 0 | Status: SHIPPED | OUTPATIENT
Start: 2019-08-09 | End: 2019-08-22

## 2019-08-09 RX ORDER — HYDROCODONE BITARTRATE AND ACETAMINOPHEN 5; 325 MG/1; MG/1
1 TABLET ORAL EVERY 6 HOURS PRN
Qty: 10 TABLET | Refills: 0 | Status: SHIPPED | OUTPATIENT
Start: 2019-08-09 | End: 2019-08-12

## 2019-08-09 RX ADMIN — CLOSTRIDIUM TETANI TOXOID ANTIGEN (FORMALDEHYDE INACTIVATED), CORYNEBACTERIUM DIPHTHERIAE TOXOID ANTIGEN (FORMALDEHYDE INACTIVATED), BORDETELLA PERTUSSIS TOXOID ANTIGEN (GLUTARALDEHYDE INACTIVATED), BORDETELLA PERTUSSIS FILAMENTOUS HEMAGGLUTININ ANTIGEN (FORMALDEHYDE INACTIVATED), BORDETELLA PERTUSSIS PERTACTIN ANTIGEN, AND BORDETELLA PERTUSSIS FIMBRIAE 2/3 ANTIGEN 0.5 ML: 5; 2; 2.5; 5; 3; 5 INJECTION, SUSPENSION INTRAMUSCULAR at 20:10

## 2019-08-09 RX ADMIN — BUPIVACAINE HYDROCHLORIDE 5 MG: 5 INJECTION, SOLUTION EPIDURAL; INTRACAUDAL at 20:45

## 2019-08-09 ASSESSMENT — MIFFLIN-ST. JEOR: SCORE: 1989.47

## 2019-08-09 NOTE — ED AVS SNAPSHOT
[facility] Emergency Department  [address] [place] [address]  Phone:  [phone]  Fax:  [phone]                                    Henry Aaron   MRN: [id number]    Department:  [facility] Emergency Department   Date of Visit:  8/9/2019           After Visit Summary Signature Page    I have received my discharge instructions, and my questions have been answered. I have discussed any challenges I see with this plan with the nurse or doctor.    ..........................................................................................................................................  Patient/Patient Representative Signature      ..........................................................................................................................................  Patient Representative Print Name and Relationship to Patient    ..................................................               ................................................  Date                                   Time    ..........................................................................................................................................  Reviewed by Signature/Title    ...................................................              ..............................................  Date                                               Time          22EPIC Rev 08/18

## 2019-08-10 NOTE — DISCHARGE INSTRUCTIONS
It was a pleasure working with you today!  I hope your condition improves rapidly!     Please keep the bandage on until orthopedics removes it to upon your recheck visit.  I spoke with Dr. Nelson, orthopedics, this evening regarding your finger.  He has clinic in [place] on Monday and [place] on Tuesday.  He said that he would work you into his schedule which ever day you would prefer.  Please call his office at [phone] to request an appointment.  Please elevate your finger is much as possible to decrease the amount of swelling, bleeding, and pain.  It is okay to use ibuprofen 600 mg every 6 hours as needed for discomfort.  Use hydrocodone for breakthrough pain not improved with the ibuprofen.  Please do not drive for 8 hours after using the hydrocodone, as it can impair your judgment.  Please take the antibiotic to prevent infection given that there is some bone exposure.

## 2019-08-10 NOTE — ED PROVIDER NOTES
History     Chief Complaint   Patient presents with     Laceration     HPI  Henry Aaron is a 39 year old right-hand dominant male who presents for evaluation of a degloving injury of his right index finger that occurred just prior to arrival.  He was wearing a pair of leather gloves and got his finger caught in the wood splitter.  Reports immediate onset of pain.  Bleeding controlled with pressure and a dressing.  He immediately presented to the ED.  Pain is rated 9 on a scale of 10 at this time.  Denies any alteration of his range of motion of the finger.  Last tetanus was in .    Allergies:  Allergies   Allergen Reactions     Augmentin Rash     Unclear if this true allergy or not. Rash appears like drug sensitivity rash but patient also [place] as starting augmentin. Advise avoidance if possible.      Sulfa Drugs        Problem List:    Patient Active Problem List    Diagnosis Date Noted     Abnormal liver enzymes 2018     Priority: Medium     Tinea corporis 2014     Priority: Medium     Folliculitis 2014     Priority: Medium     History of tobacco use      Priority: Medium      - 2011, was smoking up to 1 ppd       HYPERLIPIDEMIA LDL GOAL <160 10/31/2010     Priority: Medium        Past Medical History:    Past Medical History:   Diagnosis Date     History of tobacco use  - 2011     Hyperlipidemia LDL goal <160 10/31/2010       Past Surgical History:    Past Surgical History:   Procedure Laterality Date     HC TYMPANOPLASTY W/O MASTOID, INIT/REV W/O OSS CHAIN RECONST      bilateral     MYRINGOPLASTY  2011    Procedure:MYRINGOPLASTY; fat graft; Surgeon:HONORIO SIDDIQUI; Location:[facility] OR     TYMPANOPLASTY  Dec 2009    left ear       Family History:    Family History   Problem Relation Age of Onset     Heart Disease Maternal Grandfather          from heart failure at 75 y.o.     Cardiovascular Maternal Grandfather   "       heart failure     Neurologic Disorder Mother         MS, 40 y.o. at onset     Cancer Sister         breast cancer     Breast Cancer Sister      Asthma No family hx of      C.A.D. No family hx of      Diabetes No family hx of      Hypertension No family hx of      Cerebrovascular Disease No family hx of      Cancer - colorectal No family hx of      Prostate Cancer No family hx of      Arthritis No family hx of      Blood Disease No family hx of      Alzheimer Disease No family hx of      Circulatory No family hx of      Eye Disorder No family hx of      Gastrointestinal Disease No family hx of      Genitourinary Problems No family hx of      Lipids No family hx of      Thyroid Disease No family hx of      Respiratory No family hx of        Social History:  Marital Status:   [2]  Social History     Tobacco Use     Smoking status: Former Smoker     Types: Cigarettes     Smokeless tobacco: Never Used     Tobacco comment: 0-5 cigs/day since 1998, on and off (has quit for up to three years at one point)   Substance Use Topics     Alcohol use: No     Comment: Has not had any alcoholic beverages since 2014     Drug use: No        Medications:      cephALEXin (KEFLEX) 500 MG capsule   HYDROcodone-acetaminophen (NORCO) 5-325 MG tablet         Review of Systems   All other systems reviewed and are negative.      Physical Exam   BP: 112/86  Pulse: 93  Heart Rate: 76  Temp: 98.5  F (36.9  C)  Resp: 16  Height: 185.4 cm (6' 1\")  Weight: 102.1 kg (225 lb)  SpO2: 98 %      Physical Exam   Constitutional: He is oriented to person, place, and time. No distress.   HENT:   Head: Normocephalic and atraumatic.   Right Ear: External ear normal.   Left Ear: External ear normal.   Nose: Nose normal.   Mouth/Throat: Oropharynx is clear and moist. No oropharyngeal exudate.   Eyes: Pupils are equal, round, and reactive to light. Conjunctivae and EOM are normal. Right eye exhibits no discharge. Left eye exhibits no discharge. No " scleral icterus.   Neck: Normal range of motion. Neck supple. No thyromegaly present.   Cardiovascular: Normal rate, regular rhythm and normal heart sounds.   No murmur heard.  Pulmonary/Chest: Effort normal and breath sounds normal. No respiratory distress. He has no wheezes. He has no rales. He exhibits no tenderness.   Abdominal: Soft. Bowel sounds are normal. He exhibits no distension and no mass. There is no tenderness. There is no rebound and no guarding.   Musculoskeletal:   Right index finger, distal phalanx, with a degloving injury.  The complete nail plate has been evulsed including the distal tip of the finger.  Bone still appears to be intact.  See picture for details.  Patient has good range of motion at the DIP and PIP with intact strength.  No significant tendon involvement.  Sensation up to the lacerated area is intact.  Bleeding has been controlled with pressure.   Lymphadenopathy:     He has no cervical adenopathy.   Neurological: He is alert and oriented to person, place, and time. No cranial nerve deficit.   Skin: Skin is warm and dry. Capillary refill takes less than 2 seconds. No rash noted. He is not diaphoretic. No erythema.   Psychiatric: He has a normal mood and affect. His behavior is normal. Thought content normal.   Nursing note and vitals reviewed.              ED Course        Procedures               Critical Care time:  none               Results for orders placed or performed during the hospital encounter of 08/09/19 (from the past 24 hour(s))   XR Finger Right G/E 2 Views    Narrative    FINGER(S) TWO-THREE VIEWS RIGHT  8/9/2019 8:22 PM     HISTORY: right index finger distal phalanx degloving injury    COMPARISON: None.      Impression    IMPRESSION: Soft tissue amputation of the index finger distally. The  bones are preserved. No acute fracture. Normal joint spacing and  alignment. No radiopaque foreign body is identified. Mild soft tissue  swelling.    SHANICE DOHERTY MD        Medications   Tdap (tetanus-diphtheria-acell pertussis) (ADACEL) injection 0.5 mL (0.5 mLs Intramuscular Given 8/9/19 2010)   bupivacaine (MARCAINE) 0.5% preservative free injection (5 mg Intradermal Given by Other Clinician 8/9/19 2045)       Assessments & Plan (with Medical Decision Making)  Avulsion, finger tip     39 year old right-handed male who presents for evaluation of a degloving injury to his right distal index finger that occurred just prior to arrival.  Patient was using a wood splitter with wood gloves on when he pinched his finger and had immediate onset of pain.  Patient has lost one half of the distal phalanx fingertip on the right index finger with complete avulsion of the nail.  Upon arrival, a digital block with 0.5% bupivacaine without epinephrine was performed without complication.  Tetanus updated.  X-ray displays no evidence for fracture.  I consulted with orthopedics, Dr. Nelson, regarding the patient.  He reviewed the x-rays and also the ED pictures.  He recommended bacitracin ointment, Adaptic dressing, and tube gauze.  He agreed to see the patient on Monday or Tuesday of this week, 3 to 4 days from now.  The patient will not remove the dressing until then.  Dr. Nelson is hopeful that this will heal over time, but he will need to assess further to see if the bone does need to partially be taken down.  Discussed with patient that he should elevate the fingers much as possible.  Start antibiotics in the form of cephalexin 500 mg 4 times daily just in case he did catch a portion of the bone that was not caught on x-ray.  Ibuprofen 600 mg every 6 hours as needed for discomfort.  Hydrocodone for breakthrough pain.  No driving for 8 hours after taking this.  Indications for ED repeat evaluation prior to orthopedics recheck discussed with him.  Patient was in agreement.     I have reviewed the nursing notes.    I have reviewed the findings, diagnosis, plan and need for follow up with  the patient.       Discharge Medication List as of 8/9/2019  9:08 PM      START taking these medications    Details   cephALEXin (KEFLEX) 500 MG capsule Take 1 capsule (500 mg) by mouth 4 times daily for 10 days, Disp-40 capsule, R-0, E-Prescribe      HYDROcodone-acetaminophen (NORCO) 5-325 MG tablet Take 1 tablet by mouth every 6 hours as needed for severe pain, Disp-10 tablet, R-0, Local Print             Final diagnoses:   Avulsion, finger tip     Disclaimer: This note consists of symbols derived from keyboarding, dictation and/or voice recognition software. As a result, there may be errors in the script that have gone undetected. Please consider this when interpreting information found in this chart.      8/9/2019   Kevin Ag PA-C   [facility] EMERGENCY DEPARTMENT     Kevin Ag PA-C  08/10/19 0026

## 2019-08-10 NOTE — ED TRIAGE NOTES
Pt cut tip off of right pointer finger and nailbed in a wood splitter approx 45 minutes ago. Not actively bleeding at this time

## 2019-08-12 ENCOUNTER — OFFICE VISIT (OUTPATIENT)
Dept: ORTHOPEDICS | Facility: CLINIC | Age: 39
End: 2019-08-12
Payer: COMMERCIAL

## 2019-08-12 VITALS
BODY MASS INDEX: 29.82 KG/M2 | HEIGHT: 73 IN | HEART RATE: 60 BPM | DIASTOLIC BLOOD PRESSURE: 80 MMHG | SYSTOLIC BLOOD PRESSURE: 135 MMHG | WEIGHT: 225 LBS | OXYGEN SATURATION: 100 %

## 2019-08-12 DIAGNOSIS — S68.119A TRAUMATIC AMPUTATION OF FINGERTIP, INITIAL ENCOUNTER: ICD-10-CM

## 2019-08-12 PROCEDURE — 99203 OFFICE O/P NEW LOW 30 MIN: CPT | Performed by: ORTHOPAEDIC SURGERY

## 2019-08-12 RX ORDER — HYDROCODONE BITARTRATE AND ACETAMINOPHEN 5; 325 MG/1; MG/1
1 TABLET ORAL EVERY 6 HOURS PRN
Qty: 15 TABLET | Refills: 0 | Status: SHIPPED | OUTPATIENT
Start: 2019-08-12 | End: 2022-12-13

## 2019-08-12 ASSESSMENT — MIFFLIN-ST. JEOR: SCORE: 1989.47

## 2019-08-12 NOTE — LETTER
2019         RE: Henry Aaron  [address] [facility] [address]        Dear Colleague,    Thank you for referring your patient, Henry Aaron, to the [facility]. Please see a copy of my visit note below.    Henry Aaron is a 39 year old male who is seen in emergency room follow-up for right index fingertip amputation.  He sustained this on 2019 when he caught his right index finger caught in a log splitter on his driveway.  He cut off the tip of the index finger.  The nail is gone.  The bone is flush with the tip of the remaining soft tissue.  He was seen in the emergency room and cleaned and bandaged.  He is seen now for follow-up.  He has some shooting pain rated 1 out of 10 at rest.  More pain if it is bumped.  He is right-hand dominant.  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    Past Medical History:   Diagnosis Date     History of tobacco use  -  - 2011, was smoking up to 1 ppd     Hyperlipidemia LDL goal <160 10/31/2010       Past Surgical History:   Procedure Laterality Date     HC TYMPANOPLASTY W/O MASTOID, INIT/REV W/O OSS CHAIN RECONST      bilateral     MYRINGOPLASTY  2011    Procedure:MYRINGOPLASTY; fat graft; Surgeon:HONORIO SIDDIQUI; Location:[facility] OR     TYMPANOPLASTY  Dec 2009    left ear       Family History   Problem Relation Age of Onset     Heart Disease Maternal Grandfather          from heart failure at 75 y.o.     Cardiovascular Maternal Grandfather         heart failure     Neurologic Disorder Mother         MS, 40 y.o. at onset     Cancer Sister         breast cancer     Breast Cancer Sister      Asthma No family hx of      C.A.D. No family hx of      Diabetes No family hx of      Hypertension No family hx of      Cerebrovascular Disease No family hx of      Cancer - colorectal No family hx of      Prostate Cancer No family hx of      Arthritis No family hx of      Blood Disease No family hx of      Alzheimer Disease No  family hx of      Circulatory No family hx of      Eye Disorder No family hx of      Gastrointestinal Disease No family hx of      Genitourinary Problems No family hx of      Lipids No family hx of      Thyroid Disease No family hx of      Respiratory No family hx of        Social History     Socioeconomic History     Marital status:      Spouse name: Not on file     Number of children: Not on file     Years of education: Not on file     Highest education level: Not on file   Occupational History     Not on file   Social Needs     Financial resource strain: Not on file     Food insecurity:     Worry: Not on file     Inability: Not on file     Transportation needs:     Medical: Not on file     Non-medical: Not on file   Tobacco Use     Smoking status: Former Smoker     Types: Cigarettes     Smokeless tobacco: Never Used     Tobacco comment: 0-5 cigs/day since 1998, on and off (has quit for up to three years at one point)   Substance and Sexual Activity     Alcohol use: No     Comment: Has not had any alcoholic beverages since 2014     Drug use: No     Sexual activity: Yes     Partners: Female   Lifestyle     Physical activity:     Days per week: Not on file     Minutes per session: Not on file     Stress: Not on file   Relationships     Social connections:     Talks on phone: Not on file     Gets together: Not on file     Attends Hindu service: Not on file     Active member of club or organization: Not on file     Attends meetings of clubs or organizations: Not on file     Relationship status: Not on file     Intimate partner violence:     Fear of current or ex partner: Not on file     Emotionally abused: Not on file     Physically abused: Not on file     Forced sexual activity: Not on file   Other Topics Concern     Parent/sibling w/ CABG, MI or angioplasty before 65F 55M? Not Asked   Social History Narrative     Not on file       Current Outpatient Medications   Medication Sig Dispense Refill      "cephALEXin (KEFLEX) 500 MG capsule Take 1 capsule (500 mg) by mouth 4 times daily for 10 days 40 capsule 0     HYDROcodone-acetaminophen (NORCO) 5-325 MG tablet Take 1 tablet by mouth every 6 hours as needed for severe pain 15 tablet 0       Allergies   Allergen Reactions     Augmentin Rash     Unclear if this true allergy or not. Rash appears like drug sensitivity rash but patient [place] as starting augmentin. Advise avoidance if possible.      Sulfa Drugs        REVIEW OF SYSTEMS:  CONSTITUTIONAL:  NEGATIVE for fever, chills, change in weight, not feeling tired  SKIN:  NEGATIVE for worrisome rashes, no skin lumps, no skin ulcers and no non-healing wounds  EYES:  NEGATIVE for vision changes or irritation.  ENT/MOUTH:  NEGATIVE.  No hearing loss, no hoarseness, no difficulty swallowing.  RESP:  NEGATIVE. No cough or shortness of breath.  CV:  NEGATIVE for chest pain, palpitations or peripheral edema  GI:  NEGATIVE for nausea, abdominal pain, heartburn, or change in bowel habits  :  Negative. No dysuria, no hematuria  MUSCULOSKELETAL:  See HPI above  NEURO:  NEGATIVE . No headaches, no dizziness,  no numbness  ENDOCRINE:  NEGATIVE for temperature intolerance, skin/hair changes  HEME/ALLERGY/IMMUNE:  NEGATIVE for bleeding problems  PSYCHIATRIC:  NEGATIVE. no anxiety, no depression.     Exam:  Vitals: /80 (BP Location: Left arm, Patient Position: Sitting, Cuff Size: Adult Large)   Pulse 60   Ht 1.854 m (6' 1\")   Wt 102.1 kg (225 lb)   SpO2 100%   BMI 29.69 kg/m     BMI= Body mass index is 29.69 kg/m .  Constitutional:  healthy, alert and no distress  Neuro: Alert and Oriented x 3, Sensation grossly WNL.  Psych: Affect normal   Respiratory: Breathing not labored.  Cardiovascular: normal peripheral pulses  Lymph: no adenopathy  Skin: No rashes,worrisome lesions or skin problems  Dressing was removed from the finger.  The fingertip is clean.  He does have a " fair amount of the nailbed intact.  The nail itself is gone.  No sign of infection.    Assessment: Right index fingertip amputation.  There is no sign of infection.    Plan: New dressing was applied with bacitracin, Adaptic, gauze, tube gauze.  Return to clinic Thursday for dressing change again.  Finish Keflex 5-day course.    Again, thank you for allowing me to participate in the care of your patient.        Sincerely,        Bob Nelson MD

## 2019-08-13 NOTE — PROGRESS NOTES
Henry Aaron is a 39 year old male who is seen in emergency room follow-up for right index fingertip amputation.  He sustained this on 2019 when he caught his right index finger caught in a log splitter on his driveway.  He cut off the tip of the index finger.  The nail is gone.  The bone is flush with the tip of the remaining soft tissue.  He was seen in the emergency room and cleaned and bandaged.  He is seen now for follow-up.  He has some shooting pain rated 1 out of 10 at rest.  More pain if it is bumped.  He is right-hand dominant.  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    Past Medical History:   Diagnosis Date     History of tobacco use  -  - 2011, was smoking up to 1 ppd     Hyperlipidemia LDL goal <160 10/31/2010       Past Surgical History:   Procedure Laterality Date     HC TYMPANOPLASTY W/O MASTOID, INIT/REV W/O OSS CHAIN RECONST      bilateral     MYRINGOPLASTY  2011    Procedure:MYRINGOPLASTY; fat graft; Surgeon:HONORIO SIDDIQUI; Location:[facility]     TYMPANOPLASTY  Dec 2009    left ear       Family History   Problem Relation Age of Onset     Heart Disease Maternal Grandfather          from heart failure at 75 y.o.     Cardiovascular Maternal Grandfather         heart failure     Neurologic Disorder Mother         MS, 40 y.o. at onset     Cancer Sister         breast cancer     Breast Cancer Sister      Asthma No family hx of      C.A.D. No family hx of      Diabetes No family hx of      Hypertension No family hx of      Cerebrovascular Disease No family hx of      Cancer - colorectal No family hx of      Prostate Cancer No family hx of      Arthritis No family hx of      Blood Disease No family hx of      Alzheimer Disease No family hx of      Circulatory No family hx of      Eye Disorder No family hx of      Gastrointestinal Disease No family hx of      Genitourinary Problems No family hx of      Lipids No family hx of      Thyroid Disease No family hx of       Respiratory No family hx of        Social History     Socioeconomic History     Marital status:      Spouse name: Not on file     Number of children: Not on file     Years of education: Not on file     Highest education level: Not on file   Occupational History     Not on file   Social Needs     Financial resource strain: Not on file     Food insecurity:     Worry: Not on file     Inability: Not on file     Transportation needs:     Medical: Not on file     Non-medical: Not on file   Tobacco Use     Smoking status: Former Smoker     Types: Cigarettes     Smokeless tobacco: Never Used     Tobacco comment: 0-5 cigs/day since 1998, on and off (has quit for up to three years at one point)   Substance and Sexual Activity     Alcohol use: No     Comment: Has not had any alcoholic beverages since 2014     Drug use: No     Sexual activity: Yes     Partners: Female   Lifestyle     Physical activity:     Days per week: Not on file     Minutes per session: Not on file     Stress: Not on file   Relationships     Social connections:     Talks on phone: Not on file     Gets together: Not on file     Attends Holiness service: Not on file     Active member of club or organization: Not on file     Attends meetings of clubs or organizations: Not on file     Relationship status: Not on file     Intimate partner violence:     Fear of current or ex partner: Not on file     Emotionally abused: Not on file     Physically abused: Not on file     Forced sexual activity: Not on file   Other Topics Concern     Parent/sibling w/ CABG, MI or angioplasty before 65F 55M? Not Asked   Social History Narrative     Not on file       Current Outpatient Medications   Medication Sig Dispense Refill     cephALEXin (KEFLEX) 500 MG capsule Take 1 capsule (500 mg) by mouth 4 times daily for 10 days 40 capsule 0     HYDROcodone-acetaminophen (NORCO) 5-325 MG tablet Take 1 tablet by mouth every 6 hours as needed for severe pain 15 tablet 0  "      Allergies   Allergen Reactions     Augmentin Rash     Unclear if this true allergy or not. Rash appears like drug sensitivity rash but patient also [place] as starting augmentin. Advise avoidance if possible.      Sulfa Drugs        REVIEW OF SYSTEMS:  CONSTITUTIONAL:  NEGATIVE for fever, chills, change in weight, not feeling tired  SKIN:  NEGATIVE for worrisome rashes, no skin lumps, no skin ulcers and no non-healing wounds  EYES:  NEGATIVE for vision changes or irritation.  ENT/MOUTH:  NEGATIVE.  No hearing loss, no hoarseness, no difficulty swallowing.  RESP:  NEGATIVE. No cough or shortness of breath.  CV:  NEGATIVE for chest pain, palpitations or peripheral edema  GI:  NEGATIVE for nausea, abdominal pain, heartburn, or change in bowel habits  :  Negative. No dysuria, no hematuria  MUSCULOSKELETAL:  See HPI above  NEURO:  NEGATIVE . No headaches, no dizziness,  no numbness  ENDOCRINE:  NEGATIVE for temperature intolerance, skin/hair changes  HEME/ALLERGY/IMMUNE:  NEGATIVE for bleeding problems  PSYCHIATRIC:  NEGATIVE. no anxiety, no depression.     Exam:  Vitals: /80 (BP Location: Left arm, Patient Position: Sitting, Cuff Size: Adult Large)   Pulse 60   Ht 1.854 m (6' 1\")   Wt 102.1 kg (225 lb)   SpO2 100%   BMI 29.69 kg/m    BMI= Body mass index is 29.69 kg/m .  Constitutional:  healthy, alert and no distress  Neuro: Alert and Oriented x 3, Sensation grossly WNL.  Psych: Affect normal   Respiratory: Breathing not labored.  Cardiovascular: normal peripheral pulses  Lymph: no adenopathy  Skin: No rashes,worrisome lesions or skin problems  Dressing was removed from the finger.  The fingertip is clean.  He does have a fair amount of the nailbed intact.  The nail itself is gone.  No sign of infection.    Assessment: Right index fingertip amputation.  There is no sign of infection.    Plan: New dressing was applied with bacitracin, Adaptic, gauze, tube " gauze.  Return to clinic Thursday for dressing change again.  Finish Keflex 5-day course.

## 2019-08-15 ENCOUNTER — OFFICE VISIT (OUTPATIENT)
Dept: ORTHOPEDICS | Facility: OTHER | Age: 39
End: 2019-08-15
Payer: COMMERCIAL

## 2019-08-15 ENCOUNTER — OFFICE VISIT (OUTPATIENT)
Dept: UROLOGY | Facility: OTHER | Age: 39
End: 2019-08-15
Payer: COMMERCIAL

## 2019-08-15 VITALS
SYSTOLIC BLOOD PRESSURE: 128 MMHG | RESPIRATION RATE: 16 BRPM | HEART RATE: 67 BPM | WEIGHT: 225 LBS | HEIGHT: 73 IN | BODY MASS INDEX: 29.82 KG/M2 | DIASTOLIC BLOOD PRESSURE: 86 MMHG

## 2019-08-15 DIAGNOSIS — Z30.09 VASECTOMY EVALUATION: Primary | ICD-10-CM

## 2019-08-15 DIAGNOSIS — S68.119D TRAUMATIC AMPUTATION OF FINGERTIP, SUBSEQUENT ENCOUNTER: Primary | ICD-10-CM

## 2019-08-15 PROCEDURE — 99213 OFFICE O/P EST LOW 20 MIN: CPT | Performed by: ORTHOPAEDIC SURGERY

## 2019-08-15 PROCEDURE — 99202 OFFICE O/P NEW SF 15 MIN: CPT | Performed by: UROLOGY

## 2019-08-15 ASSESSMENT — MIFFLIN-ST. JEOR: SCORE: 1989.47

## 2019-08-15 NOTE — PROGRESS NOTES
Vasectomy Consult    Reason for visit:   Discuss as a method of birth control/sterilization.  He is interested in vasectomy scrotally.  Patient has 3 children and desires sterilization.  Does not want to use condom or having partners on birth control pills.  He has no erections problem.  He has no urinary complaints.    Current Outpatient Medications   Medication Sig Dispense Refill     cephALEXin (KEFLEX) 500 MG capsule Take 1 capsule (500 mg) by mouth 4 times daily for 10 days 40 capsule 0     HYDROcodone-acetaminophen (NORCO) 5-325 MG tablet Take 1 tablet by mouth every 6 hours as needed for severe pain 15 tablet 0     Allergies   Allergen Reactions     Augmentin Rash     Unclear if this true allergy or not. Rash appears like drug sensitivity rash but patient also [place] same [place] as starting augmentin. Advise avoidance if possible.      Sulfa Drugs      Past Medical History:   Diagnosis Date     History of tobacco use  -  - 2011, was smoking up to 1 ppd     Hyperlipidemia LDL goal <160 10/31/2010     Past Surgical History:   Procedure Laterality Date      TYMPANOPLASTY W/O MASTOID, INIT/REV W/O OSS CHAIN RECONST      bilateral     MYRINGOPLASTY  2011    Procedure:MYRINGOPLASTY; fat graft; Surgeon:HONORIO SIDDIQUI; Location:[facility]     TYMPANOPLASTY  Dec 2009    left ear      Family History   Problem Relation Age of Onset     Heart Disease Maternal Grandfather          from heart failure at 75 y.o.     Cardiovascular Maternal Grandfather         heart failure     Neurologic Disorder Mother         MS, 40 y.o. at onset     Cancer Sister         breast cancer     Breast Cancer Sister      Asthma No family hx of      C.A.D. No family hx of      Diabetes No family hx of      Hypertension No family hx of      Cerebrovascular Disease No family hx of      Cancer - colorectal No family hx of      Prostate Cancer No family hx of      Arthritis No  family hx of      Blood Disease No family hx of      Alzheimer Disease No family hx of      Circulatory No family hx of      Eye Disorder No family hx of      Gastrointestinal Disease No family hx of      Genitourinary Problems No family hx of      Lipids No family hx of      Thyroid Disease No family hx of      Respiratory No family hx of      Social History     Socioeconomic History     Marital status:      Spouse name: Not on file     Number of children: Not on file     Years of education: Not on file     Highest education level: Not on file   Occupational History     Not on file   Social Needs     Financial resource strain: Not on file     Food insecurity:     Worry: Not on file     Inability: Not on file     Transportation needs:     Medical: Not on file     Non-medical: Not on file   Tobacco Use     Smoking status: Former Smoker     Types: Cigarettes     Smokeless tobacco: Never Used     Tobacco comment: 0-5 cigs/day since 1998, on and off (has quit for up to three years at one point)   Substance and Sexual Activity     Alcohol use: No     Comment: Has not had any alcoholic beverages since 2014     Drug use: No     Sexual activity: Yes     Partners: Female   Lifestyle     Physical activity:     Days per week: Not on file     Minutes per session: Not on file     Stress: Not on file   Relationships     Social connections:     Talks on phone: Not on file     Gets together: Not on file     Attends Voodoo service: Not on file     Active member of club or organization: Not on file     Attends meetings of clubs or organizations: Not on file     Relationship status: Not on file     Intimate partner violence:     Fear of current or ex partner: Not on file     Emotionally abused: Not on file     Physically abused: Not on file     Forced sexual activity: Not on file   Other Topics Concern     Parent/sibling w/ CABG, MI or angioplasty before 65F 55M? Not Asked   Social History Narrative     Not on file        REVIEW OF SYSTEMS  =================  C: NEGATIVE for fever, chills, change in weight  I: NEGATIVE for worrisome rashes, moles or lesions  E/M: NEGATIVE for ear, mouth and throat problems  R: NEGATIVE for significant cough or SHORTNESS OF BREATH  CV:  NEGATIVE for chest pain, palpitations or peripheral edema  GI: NEGATIVE for nausea, abdominal pain, heartburn, or change in bowel habits  NEURO: NEGATIVE numbness/weakness  : see HPI  PSYCH: NEGATIVE depression/anxiety  LYmph: no new enlarged lymph nodes  Ortho: no new trauma/movements      Physical Exam:  BP (P) 138/85 (BP Location: Right arm, Patient Position: Chair, Cuff Size: Adult Regular)   Pulse (P) 56   Temp (P) 97.7  F (36.5  C)   SpO2 (P) 100%    Patient is pleasant, in no acute distress, good general condition.  HEENT:  Normalcephalic, atraumatic  Lung: no evidence of respiratory distress    Abdomen: Soft, nondistended, non tender. No masses. No rebound or guarding.   Exam: penis no d/c testis no masses bilateral vas palpated no scrotal lesion  Skin: Warm and dry.  No redness.  Neuro: grossly normal  Psych normal mood and affect  Musculoskeletal  moving all extremities        Discussed    That vasectomy is permanent method of birth control.    That vasectomy can fail due to recanalization of the vas even many months/years later.    That he needs 2 negative sperm checks to be considered sterile    That there are other methods that are not permanent and also that the sperm can be frozen for later use.    How the technique is performed, risks of infection, bleeding, damage to the testes vessels and testes atrophy    Long term complication such as chronic and difficult to treat testes pain and questionable increase incidence of prostate cancer    That the procedure can be done at the clinic or hospital OR        Plan:    Stop Aspirin  Will schedule Vasectomy in the future

## 2019-08-15 NOTE — PROGRESS NOTES
"HISTORY OF PRESENT ILLNESS:    Henry Aaron is a 39 year old male who is seen in follow up for   Chief Complaint   Patient presents with     RECHECK     Right index finger amputation on 8/9/19.   Present symptoms:  Patient is 6 days s/p injury.  He reports good pain control.  He is here for dressing change  Treatments tried to this point: Last dressing change 3 days ago.  Family present: wife.  Patient evaluation done with Dr. Bob Nelson     Physical Exam:  Vitals: /86   Pulse 67   Resp 16   Ht 1.854 m (6' 1\")   Wt 102.1 kg (225 lb)   BMI 29.69 kg/m    BMI= Body mass index is 29.69 kg/m .  Constitutional: healthy, alert and no acute distress   Psychiatric: mentation appears normal and affect normal/bright  NEURO: no focal deficits  SKIN: no excoriation or erythema. No signs of infection.  JOINT/EXTREMITIES:  Good capillary refill  Dressings removed.  No significant bleeding   Hand/Finger Exam: Inspection:Index Finger:  Minimal swelling.  Wound bed at distal tip with good granulation appearance.    Tender: Patient does have some appropriate amount of pain with dressing removal.     ASSESSMENT:    Chief Complaint   Patient presents with     RECHECK     Right index finger amputation on 8/9/19.       ICD-10-CM    1. Traumatic amputation of fingertip, subsequent encounter S68.119D    right index finger    Plan:   Dressing changed today.  Bacitracin, Adaptic, 3x3 guaze, tube guaze secured by coban.    Patient or Wife may do dressing changes every 3rd day as needed.  Some extra supplies given  OK to shower with dressings removed.  Note written for patient off work x 4 weeks ()  Follow up 1 week for wound recheck.    BP Readings from Last 1 Encounters:   08/15/19 128/86     BP noted to be well controlled today in office.     Scribed by  Reny Roper PA-C   8/15/2019  10:19 AM      I attest I have seen and evaluated the patient.  I agree with above impression and plan.    Bob Nelson MD  "

## 2019-08-15 NOTE — LETTER
[address]  [address]  [facility] [address]  [phone]          August 15, 2019    RE:  Henry Aaron                                                                                                                                                       [id number] [facility] [address]            To whom it may concern:    Henry Aaron is under my professional care for right index finger injury.  To allow for wound healing, please allow patient 4 weeks off work.      Sincerely,        Bob Nelson MD

## 2019-08-15 NOTE — PATIENT INSTRUCTIONS
May do dressing changes at home if preferred.      Bacitracin  Petroleum guaze or non-stick dressing like Telfa  Guaze and secure with tape or coban    Follow up 1 week

## 2019-08-15 NOTE — LETTER
"    8/15/2019         RE: Henry Aaron  [address] [facility] [address]        Dear Colleague,    Thank you for referring your patient, Herny Aaron, to the [facility]. Please see a copy of my visit note below.    HISTORY OF PRESENT ILLNESS:    Henry Aaron is a 39 year old male who is seen in follow up for   Chief Complaint   Patient presents with     RECHECK     Right index finger amputation on 8/9/19.   Present symptoms:  Patient is 6 days s/p injury.  He reports good pain control.  He is here for dressing change  Treatments tried to this point: Last dressing change 3 days ago.  Family present: wife.  Patient evaluation done with Dr. Bob Nelson     Physical Exam:  Vitals: /86   Pulse 67   Resp 16   Ht 1.854 m (6' 1\")   Wt 102.1 kg (225 lb)   BMI 29.69 kg/m     BMI= Body mass index is 29.69 kg/m .  Constitutional: healthy, alert and no acute distress   Psychiatric: mentation appears normal and affect normal/bright  NEURO: no focal deficits  SKIN: no excoriation or erythema. No signs of infection.  JOINT/EXTREMITIES:  Good capillary refill  Dressings removed.  No significant bleeding   Hand/Finger Exam: Inspection:Index Finger:  Minimal swelling.  Wound bed at distal tip with good granulation appearance.    Tender: Patient does have some appropriate amount of pain with dressing removal.     ASSESSMENT:    Chief Complaint   Patient presents with     RECHECK     Right index finger amputation on 8/9/19.       ICD-10-CM    1. Traumatic amputation of fingertip, subsequent encounter S68.119D    right index finger    Plan:   Dressing changed today.  Bacitracin, Adaptic, 3x3 guaze, tube guaze secured by coban.    Patient or Wife may do dressing changes every 3rd day as needed.  Some extra supplies given  OK to shower with dressings removed.  Note written for patient off work x 4 weeks ()  Follow up 1 week for wound recheck.    BP Readings from Last 1 Encounters:   08/15/19 " 128/86     BP noted to be well controlled today in office.     Scribed by  Reny Roper PA-C   8/15/2019  10:19 AM      I attest I have seen and evaluated the patient.  I agree with above impression and plan.    Bob Nelson MD    Again, thank you for allowing me to participate in the care of your patient.        Sincerely,        Bob Nelson MD

## 2019-08-22 ENCOUNTER — OFFICE VISIT (OUTPATIENT)
Dept: ORTHOPEDICS | Facility: OTHER | Age: 39
End: 2019-08-22
Payer: COMMERCIAL

## 2019-08-22 VITALS
HEIGHT: 73 IN | DIASTOLIC BLOOD PRESSURE: 88 MMHG | SYSTOLIC BLOOD PRESSURE: 132 MMHG | HEART RATE: 71 BPM | WEIGHT: 225 LBS | BODY MASS INDEX: 29.82 KG/M2

## 2019-08-22 DIAGNOSIS — S68.119D TRAUMATIC AMPUTATION OF FINGERTIP, SUBSEQUENT ENCOUNTER: Primary | ICD-10-CM

## 2019-08-22 PROCEDURE — 99212 OFFICE O/P EST SF 10 MIN: CPT | Performed by: ORTHOPAEDIC SURGERY

## 2019-08-22 ASSESSMENT — MIFFLIN-ST. JEOR: SCORE: 1989.47

## 2019-08-22 NOTE — LETTER
8/22/2019         RE: Henyr Aaron  [address]  [facility] [address]        Dear Colleague,    Thank you for referring your patient, Henry Aaron, to the [facility]. Please see a copy of my visit note below.    Follow up right finger tip amputation 8/9/19.  He has been changing the dressing.  The wound is clean without inferior.  It is still raw and moist.    New dressing applied.  Continue dressing changes.  It may also be left open to air at times.  Ok for showers.  Return to clinic 2 weeks for clinical recheck.    Again, thank you for allowing me to participate in the care of your patient.        Sincerely,        Bob Nelson MD

## 2019-08-22 NOTE — PROGRESS NOTES
Follow up right finger tip amputation 8/9/19.  He has been changing the dressing.  The wound is clean without inferior.  It is still raw and moist.    New dressing applied.  Continue dressing changes.  It may also be left open to air at times.  Ok for showers.  Return to clinic 2 weeks for clinical recheck.

## 2019-08-28 ENCOUNTER — OFFICE VISIT (OUTPATIENT)
Dept: UROLOGY | Facility: OTHER | Age: 39
End: 2019-08-28
Payer: COMMERCIAL

## 2019-08-28 DIAGNOSIS — Z30.2 ENCOUNTER FOR VASECTOMY: Primary | ICD-10-CM

## 2019-08-28 PROCEDURE — 55250 REMOVAL OF SPERM DUCT(S): CPT | Performed by: UROLOGY

## 2019-08-28 PROCEDURE — 88302 TISSUE EXAM BY PATHOLOGIST: CPT | Performed by: UROLOGY

## 2019-08-30 LAB — COPATH REPORT: NORMAL

## 2019-10-10 NOTE — PATIENT INSTRUCTIONS
Your Vasectomy is scheduled ______________________  Please call [phone] if you need to reschedule this appointment or if you have any questions.     General Vasectomy Information    Vasectomy is a surgery.  If it is successful, it will be impossible for you to ever father children.  The following information regards the male sterilization done by an operation called a vasectomy.  This is done in the physician's office.    The operation done to sterilize the male is easier, safer and much less expensive than the operation done to sterilize the woman.    Sterilization should be considered permanent.  For most males, once the operation is done, it can never me undone.  There are attempts made occasionally to reconnect the tubes that have been cut during the procedure, but this is very difficult and expensive and works only about 50-70% of the time.  In order for any of the physicians in our clinic to do a vasectomy, we require that you consider this a permanent form a sterilization.    A vasectomy can be done at any time, but it is best to think about having it done when you can take at least one day off from work and any excess activities.    Your decision to have a vasectomy should only be made with the following facts clear in mind.    1. First, a vasectomy is only one of several means of birth control.  Many form of temporary contraception are available.  If you have any questions about other methods, please discuss this with your physician.    2. A vasectomy may be unsuccessful in approximately one out of 1000 couples per year.  This occurs when the tubes which are cut during the procedure reconnect themselves.  Sterility cannot be guaranteed.    3. You should be aware that it is the current belief of the medical profession that a vasectomy procedure does not alter a male physically, physiologically or sexually.  Because each person is a unique individual, there is always the possibility of an adverse  phychiatric reaction.  This can be best avoided by being very comfortable in your own mind that you want to have this done, and that you do not want to father any children in the future.  If this is not clear in your mind, this should be further discussed with your physician.    4. You will not notice a change in the volume of your ejaculate since sperm is a very small amount of the semen and it is only the sperm that is stopped from entering the ejaculate after a vasectomy.  Your prostate and seminal vesicle glands really supply most of the semen and this is not at all decreased after a vasectomy.  Also there is no effect on the male hormones.    5. You do not become sterile immediately following a vasectomy due to the fact that there is still sperm remaining in your system that must be eliminated by ejaculation.  For this reason, your sexual partner could still become pregnant for a period of time following the vasectomy operation.  It is necessary that contraceptive measures be used until you receive confirmation from your physician that you are sterile.  It takes approximately 12 ejaculations to clear the semen of sperm, but this can differ in different men.  For this reason, it is very important that your semen be checked for sperm before you are considered sterile, and this should be done approximately 12 weeks after your vasectomy.      6. Vasectomy has risks and benefits.  Among the risks are possible complications resulting from the procedure.  These risks include but are not limited to:   A.  Bleeding, infection, or hematoma occuring during or in the recovery period   from the procedure.   B.  Sperm granuloma or a small pea to walnut sized lump which is a collection of   scar tissue and sperm in your scrotal sack and remains permanently   C.  There may be an increased risk of prostate cancer although the data is   unclear.  Preparation for Vasectomy:  Shave the hair away from the base of your penis and  around your testicles.  Wear snug underwear the day of the vasectomy to support your testicles.  Do not take aspirin, ibuprofen, advil, motrin, aleve products one week prior to your vasectomy.  Arrange for a  if you take any medication for relaxation from the physician.         No

## 2022-04-14 ENCOUNTER — OFFICE VISIT (OUTPATIENT)
Dept: FAMILY MEDICINE | Facility: OTHER | Age: 42
End: 2022-04-14
Payer: COMMERCIAL

## 2022-04-14 VITALS
BODY MASS INDEX: 32.64 KG/M2 | DIASTOLIC BLOOD PRESSURE: 84 MMHG | HEIGHT: 72 IN | WEIGHT: 241 LBS | HEART RATE: 65 BPM | OXYGEN SATURATION: 99 % | SYSTOLIC BLOOD PRESSURE: 122 MMHG | TEMPERATURE: 97.5 F | RESPIRATION RATE: 16 BRPM

## 2022-04-14 DIAGNOSIS — K92.1 BLOOD IN STOOL: Primary | ICD-10-CM

## 2022-04-14 DIAGNOSIS — K64.8 INTERNAL HEMORRHOIDS: ICD-10-CM

## 2022-04-14 PROCEDURE — 46600 DIAGNOSTIC ANOSCOPY SPX: CPT | Performed by: FAMILY MEDICINE

## 2022-04-14 ASSESSMENT — PAIN SCALES - GENERAL: PAINLEVEL: NO PAIN (1)

## 2022-04-14 NOTE — PROGRESS NOTES
"  Assessment & Plan       ICD-10-CM    1. Blood in stool  K92.1    2. Internal hemorrhoids  K64.8      Internal hemorrhoid was present today and we discussed the risk factors for why this occurred and will work to reduce use risks.  As it is an internal hemorrhoid there is very little else he needs to do for medication management or monitoring      13 minutes spent on the date of the encounter doing chart review, history and exam, documentation and further activities per the note       BMI:   Estimated body mass index is 32.64 kg/m  as calculated from the following:    Height as of this encounter: 1.83 m (6' 0.05\").    Weight as of this encounter: 109.3 kg (241 lb).   Weight management plan: Discussed healthy diet and exercise guidelines        No follow-ups on file.    Maria Alejandra Pratt MD, MD  [facility]    Evelio Figueroa is a 41 year old who presents for the following health issues     History of Present Illness       Reason for visit:  Blood in my stool  Symptom onset:  3-7 days ago  Symptoms include:  No pain associated with this  Symptom intensity:  Moderate  Symptom progression:  Staying the same  Had these symptoms before:  No  What makes it worse:  No  What makes it better:  No    He eats 2-3 servings of fruits and vegetables daily.He consumes 0 sweetened beverage(s) daily.He exercises with enough effort to increase his heart rate 30 to 60 minutes per day.  He exercises with enough effort to increase his heart rate 5 days per week.   He is taking medications regularly.       Review of Systems   Constitutional, HEENT, cardiovascular, pulmonary, GI, , musculoskeletal, neuro, skin, endocrine and psych systems are negative, except as otherwise noted.      Objective    /84   Pulse 65   Temp 97.5  F (36.4  C) (Temporal)   Resp 16   Ht 1.83 m (6' 0.05\")   Wt 109.3 kg (241 lb)   SpO2 99%   BMI 32.64 kg/m    Body mass index is 32.64 kg/m .  Physical Exam   GENERAL: healthy, alert " and no distress  RESP: lungs clear to auscultation - no rales, rhonchi or wheezes  CV: regular rate and rhythm, normal S1 S2, no S3 or S4, no murmur, click or rub, no peripheral edema and peripheral pulses strong  ABDOMEN: soft, nontender, no hepatosplenomegaly, no masses and bowel sounds normal  RECTAL: anoscopy shows internal hemorrhoid  MS: no gross musculoskeletal defects noted, no edema  SKIN: no suspicious lesions or rashes  NEURO: Normal strength and tone, mentation intact and speech normal  PSYCH: mentation appears normal, affect normal/bright

## 2022-12-13 ENCOUNTER — OFFICE VISIT (OUTPATIENT)
Dept: FAMILY MEDICINE | Facility: OTHER | Age: 42
End: 2022-12-13
Payer: COMMERCIAL

## 2022-12-13 VITALS
TEMPERATURE: 97.4 F | HEIGHT: 72 IN | SYSTOLIC BLOOD PRESSURE: 122 MMHG | RESPIRATION RATE: 16 BRPM | HEART RATE: 78 BPM | WEIGHT: 242 LBS | DIASTOLIC BLOOD PRESSURE: 78 MMHG | BODY MASS INDEX: 32.78 KG/M2 | OXYGEN SATURATION: 100 %

## 2022-12-13 DIAGNOSIS — Z80.3 FAMILY HISTORY OF MALIGNANT NEOPLASM OF BREAST: ICD-10-CM

## 2022-12-13 DIAGNOSIS — E78.5 HYPERLIPIDEMIA LDL GOAL <130: ICD-10-CM

## 2022-12-13 DIAGNOSIS — L30.9 ECZEMA, UNSPECIFIED TYPE: ICD-10-CM

## 2022-12-13 DIAGNOSIS — Z12.11 SPECIAL SCREENING FOR MALIGNANT NEOPLASMS, COLON: ICD-10-CM

## 2022-12-13 DIAGNOSIS — E66.811 OBESITY (BMI 30.0-34.9): ICD-10-CM

## 2022-12-13 DIAGNOSIS — Z00.00 ROUTINE HISTORY AND PHYSICAL EXAMINATION OF ADULT: Primary | ICD-10-CM

## 2022-12-13 DIAGNOSIS — Z80.0 FAMILY HISTORY OF COLON CANCER: ICD-10-CM

## 2022-12-13 LAB
ALBUMIN SERPL-MCNC: 3.8 G/DL (ref 3.4–5)
ALP SERPL-CCNC: 83 U/L (ref 40–150)
ALT SERPL W P-5'-P-CCNC: 78 U/L (ref 0–70)
ANION GAP SERPL CALCULATED.3IONS-SCNC: 4 MMOL/L (ref 3–14)
AST SERPL W P-5'-P-CCNC: 24 U/L (ref 0–45)
BILIRUB SERPL-MCNC: 0.4 MG/DL (ref 0.2–1.3)
BUN SERPL-MCNC: 19 MG/DL (ref 7–30)
CALCIUM SERPL-MCNC: 8.8 MG/DL (ref 8.5–10.1)
CHLORIDE BLD-SCNC: 111 MMOL/L (ref 94–109)
CHOLEST SERPL-MCNC: 206 MG/DL
CO2 SERPL-SCNC: 27 MMOL/L (ref 20–32)
CREAT SERPL-MCNC: 0.87 MG/DL (ref 0.66–1.25)
ERYTHROCYTE [DISTWIDTH] IN BLOOD BY AUTOMATED COUNT: 14.2 % (ref 10–15)
FASTING STATUS PATIENT QL REPORTED: YES
GFR SERPL CREATININE-BSD FRML MDRD: >90 ML/MIN/1.73M2
GLUCOSE BLD-MCNC: 91 MG/DL (ref 70–99)
HCT VFR BLD AUTO: 43.8 % (ref 40–53)
HDLC SERPL-MCNC: 49 MG/DL
HGB BLD-MCNC: 14.3 G/DL (ref 13.3–17.7)
LDLC SERPL CALC-MCNC: 131 MG/DL
MCH RBC QN AUTO: 28.1 PG (ref 26.5–33)
MCHC RBC AUTO-ENTMCNC: 32.6 G/DL (ref 31.5–36.5)
MCV RBC AUTO: 86 FL (ref 78–100)
NONHDLC SERPL-MCNC: 157 MG/DL
PLATELET # BLD AUTO: 274 10E3/UL (ref 150–450)
POTASSIUM BLD-SCNC: 4.4 MMOL/L (ref 3.4–5.3)
PROT SERPL-MCNC: 7.4 G/DL (ref 6.8–8.8)
RBC # BLD AUTO: 5.08 10E6/UL (ref 4.4–5.9)
SODIUM SERPL-SCNC: 142 MMOL/L (ref 133–144)
TRIGL SERPL-MCNC: 131 MG/DL
WBC # BLD AUTO: 6 10E3/UL (ref 4–11)

## 2022-12-13 PROCEDURE — 99213 OFFICE O/P EST LOW 20 MIN: CPT | Mod: 25 | Performed by: PHYSICIAN ASSISTANT

## 2022-12-13 PROCEDURE — 80053 COMPREHEN METABOLIC PANEL: CPT | Performed by: PHYSICIAN ASSISTANT

## 2022-12-13 PROCEDURE — 80061 LIPID PANEL: CPT | Performed by: PHYSICIAN ASSISTANT

## 2022-12-13 PROCEDURE — 85027 COMPLETE CBC AUTOMATED: CPT | Performed by: PHYSICIAN ASSISTANT

## 2022-12-13 PROCEDURE — 99396 PREV VISIT EST AGE 40-64: CPT | Performed by: PHYSICIAN ASSISTANT

## 2022-12-13 PROCEDURE — 36415 COLL VENOUS BLD VENIPUNCTURE: CPT | Performed by: PHYSICIAN ASSISTANT

## 2022-12-13 ASSESSMENT — ENCOUNTER SYMPTOMS
ARTHRALGIAS: 0
WEAKNESS: 0
FEVER: 0
MYALGIAS: 0
PARESTHESIAS: 0
FREQUENCY: 0
CONSTIPATION: 0
SHORTNESS OF BREATH: 0
HEADACHES: 0
NERVOUS/ANXIOUS: 0
HEMATURIA: 0
ABDOMINAL PAIN: 0
PALPITATIONS: 0
EYE PAIN: 0
HEARTBURN: 0
CHILLS: 0
HEMATOCHEZIA: 0
DYSURIA: 0
SORE THROAT: 0
DIZZINESS: 0
NAUSEA: 0
COUGH: 0
JOINT SWELLING: 0
DIARRHEA: 0

## 2022-12-13 ASSESSMENT — PAIN SCALES - GENERAL: PAINLEVEL: NO PAIN (0)

## 2022-12-13 NOTE — PROGRESS NOTES
SUBJECTIVE:   CC: Henry is an 42 year old who presents for preventative health visit.   Patient has been advised of split billing requirements and indicates understanding: Yes  Healthy Habits:     Getting at least 3 servings of Calcium per day:  Yes    Bi-annual eye exam:  Yes    Dental care twice a year:  NO    Sleep apnea or symptoms of sleep apnea:  None    Diet:  Regular (no restrictions)    Frequency of exercise:  None    Taking medications regularly:  Not Applicable    Medication side effects:  None    PHQ-2 Total Score: 0    Additional concerns today:  Yes    Today's PHQ-2 Score:   PHQ-2 ( 1999 [employer]) 12/13/2022   Q1: Little interest or pleasure in doing things 0   Q2: Feeling down, depressed or hopeless 0   PHQ-2 Score 0   Q1: Little interest or pleasure in doing things Not at all   Q2: Feeling down, depressed or hopeless Not at all   PHQ-2 Score 0       Have you ever done Advance Care Planning? (For example, a Health Directive, POLST, or a discussion with a medical provider or your loved ones about your wishes): No, advance care planning information given to patient to review.  Patient plans to discuss their wishes with loved ones or provider.      Social History     Tobacco Use     Smoking status: Former     Types: Cigarettes     Smokeless tobacco: Never     Tobacco comments:     0-5 cigs/day since 1998, on and off (has quit for up to three years at one point)   Substance Use Topics     Alcohol use: No     Comment: Has not had any alcoholic beverages since 2014         Alcohol Use 12/13/2022   Prescreen: >3 drinks/day or >7 drinks/week? Not Applicable   Prescreen: >3 drinks/day or >7 drinks/week? -       Last PSA: No results found for: PSA    Reviewed orders with patient. Reviewed health maintenance and updated orders accordingly - Yes  Lab work is in process  Labs reviewed in EPIC  BP Readings from Last 3 Encounters:   12/13/22 122/78   04/14/22 122/84   08/22/19 132/88    Wt Readings from Last 3  Encounters:   22 109.8 kg (242 lb)   22 109.3 kg (241 lb)   19 102.1 kg (225 lb)                  Patient Active Problem List   Diagnosis     Hyperlipidemia LDL goal <130     History of tobacco use     Tinea corporis     Folliculitis     Abnormal liver enzymes     Amputation of finger tip     Family history of colon cancer - maternal uncle     Family history of malignant neoplasm of breast - older sister     Past Surgical History:   Procedure Laterality Date     HC TYMPANOPLASTY W/O MASTOID, INIT/REV W/O OSS CHAIN RECONST      bilateral     MYRINGOPLASTY  2011    Procedure:MYRINGOPLASTY; fat graft; Surgeon:HONORIO SIDDIQUI Location:[facility] OR     TYMPANOPLASTY  Dec 2009    left ear       Social History     Tobacco Use     Smoking status: Former     Types: Cigarettes     Smokeless tobacco: Never     Tobacco comments:     0-5 cigs/day since , on and off (has quit for up to three years at one point)   Substance Use Topics     Alcohol use: No     Comment: Has not had any alcoholic beverages since      Family History   Problem Relation Age of Onset     Heart Disease Maternal Grandfather          from heart failure at 75 y.o.     Cardiovascular Maternal Grandfather         heart failure     Neurologic Disorder Mother         MS, 40 y.o. at onset     Cancer Sister         breast cancer     Breast Cancer Sister      Asthma No family hx of      C.A.D. No family hx of      Diabetes No family hx of      Hypertension No family hx of      Cerebrovascular Disease No family hx of      Cancer - colorectal No family hx of      Prostate Cancer No family hx of      Arthritis No family hx of      Blood Disease No family hx of      Alzheimer Disease No family hx of      Circulatory No family hx of      Eye Disorder No family hx of      Gastrointestinal Disease No family hx of      Genitourinary Problems No family hx of      Lipids No family hx of      Thyroid Disease No family hx of      Respiratory  No family hx of          No current outpatient medications on file.     Allergies   Allergen Reactions     Augmentin Rash     Unclear if this true allergy or not. Rash appears like drug sensitivity rash but patient [place] starting augmentin. Advise avoidance if possible.      Sulfa Drugs      Recent Labs   Lab Test 04/13/18  0856 03/09/18  0913 02/05/18  0000   A1C  --   --  5.2   LDL  --  110* 121   HDL  --  64 57.9   TRIG  --  86 125   * 109* 143*   CR  --  0.79 0.86   GFRESTIMATED  --  >90  --    GFRESTBLACK  --  >90  --    POTASSIUM  --  4.5  --         Reviewed and updated as needed this visit by clinical staff   Tobacco  Allergies  Meds              Reviewed and updated as needed this visit by Provider                 Past Medical History:   Diagnosis Date     History of tobacco use 1998 - Jan 2011 1998 - Jan 2011, was smoking up to 1 ppd     Hyperlipidemia LDL goal <160 10/31/2010      Past Surgical History:   Procedure Laterality Date     HC TYMPANOPLASTY W/O MASTOID, INIT/REV W/O OSS CHAIN RECONST  1980s    bilateral     MYRINGOPLASTY  4/26/2011    Procedure:MYRINGOPLASTY; fat graft; Surgeon:HONORIO SIDDIQUI; Location:[facility]     TYMPANOPLASTY  Dec 2009    left ear       Review of Systems   Constitutional: Negative for chills and fever.   HENT: Negative for congestion, ear pain, hearing loss and sore throat.    Eyes: Negative for pain and visual disturbance.   Respiratory: Negative for cough and shortness of breath.    Cardiovascular: Negative for chest pain, palpitations and peripheral edema.   Gastrointestinal: Negative for abdominal pain, constipation, diarrhea, heartburn, hematochezia and nausea.   Genitourinary: Negative for dysuria, frequency, genital sores, hematuria, impotence, penile discharge and urgency.   Musculoskeletal: Negative for arthralgias, joint swelling and myalgias.   Skin: Negative for rash.   Neurological: Negative  "for dizziness, weakness, headaches and paresthesias.   Psychiatric/Behavioral: Negative for mood changes. The patient is not nervous/anxious.        OBJECTIVE:   /78   Pulse 78   Temp 97.4  F (36.3  C) (Temporal)   Resp 16   Ht 1.84 m (6' 0.44\")   Wt 109.8 kg (242 lb)   SpO2 100%   BMI 32.42 kg/m      Physical Exam  GENERAL: healthy, alert and no distress  EYES: Eyes grossly normal to inspection, PERRL and conjunctivae and sclerae normal  HENT: ear canals and TM's normal, nose and mouth without ulcers or lesions  NECK: no adenopathy, no asymmetry, masses, or scars and thyroid normal to palpation  RESP: lungs clear to auscultation - no rales, rhonchi or wheezes  CV: regular rate and rhythm, normal S1 S2, no S3 or S4, no murmur, click or rub, no peripheral edema and peripheral pulses strong  ABDOMEN: soft, nontender, no hepatosplenomegaly, no masses and bowel sounds normal  MS: no gross musculoskeletal defects noted, no edema  SKIN: no suspicious lesions or rashes to exposed visible skin with exception of just above the right gluteus muscle there appears to be an area of eczema or possibly even psoriasis.  This is approximately 2 cm in rough diameter.  There are 2 smaller spots superior to this.  Mild erythema and white scaling is present.  NEURO: Normal strength and tone, mentation intact and speech normal  PSYCH: mentation appears normal, affect normal/bright      ASSESSMENT/PLAN:   Henry was seen today for physical.    Diagnoses and all orders for this visit:    Routine history and physical examination of adult  -     Adult GI  Referral - Procedure Only; Future  -     CBC with platelets; Future  -     Comprehensive metabolic panel (BMP + Alb, Alk Phos, ALT, AST, Total. Bili, TP); Future  -     Lipid panel reflex to direct LDL Fasting; Future  -     Lipid panel reflex to direct LDL Fasting  -     Comprehensive metabolic panel (BMP + Alb, Alk Phos, ALT, AST, Total. Bili, TP)  -     CBC with " platelets    Family history of colon cancer - maternal uncle  -     Adult GI  Referral - Procedure Only; Future    Hyperlipidemia LDL goal <130  -     CBC with platelets; Future  -     Comprehensive metabolic panel (BMP + Alb, Alk Phos, ALT, AST, Total. Bili, TP); Future  -     Lipid panel reflex to direct LDL Fasting; Future  -     Lipid panel reflex to direct LDL Fasting  -     Comprehensive metabolic panel (BMP + Alb, Alk Phos, ALT, AST, Total. Bili, TP)  -     CBC with platelets    Family history of malignant neoplasm of breast - older sister    Special screening for malignant neoplasms, colon  -     Adult GI  Referral - Procedure Only; Future    Obesity (BMI 30.0-34.9)    Eczema, unspecified type - right superior gluteal region    Other orders  -     REVIEW OF HEALTH MAINTENANCE PROTOCOL ORDERS    42-year-old male here for routine physical.  Repeat in 1 year.    There is a family history of colon cancer in a maternal uncle.  He has had some internal hemorrhoids in the past.  He request colonoscopy I feel that this is an appropriate approach to this.  More than likely it will be benign.    LDL goal discussed and labs are in place to evaluate this further.  Follow-up based on results.    's older sister had breast cancer but he does not have any lumps or bumps.  No other concerns related to this at this point time.    He is obese with a body mass index of 32.  Do recommend increase physical exercise and decrease caloric intake.  Follow-up as needed.      To the eczematous area I advise a trial some 1% hydrocortisone cream to this area for 2 to 3 weeks and then let me know if he has any improvement.  After that we can put a prescription through for prescription strength medications and trial that.  If it is not successful in 4 to 6 weeks then I would consider biopsy of this region.  This may indeed be psoriasis.    Patient has been advised of split billing requirements and indicates  "understanding: Yes      COUNSELING:   Reviewed preventive health counseling, as reflected in patient instructions       Regular exercise       Healthy diet/nutrition       Vision screening       Hearing screening      BMI:   Estimated body mass index is 32.42 kg/m  as calculated from the following:    Height as of this encounter: 1.84 m (6' 0.44\").    Weight as of this encounter: 109.8 kg (242 lb).   Weight management plan: Discussed healthy diet and exercise guidelines      He reports that he has quit smoking. His smoking use included cigarettes. He has never used smokeless tobacco.        Ronald Avalos PA-C  [facility]  "

## 2022-12-16 ENCOUNTER — TELEPHONE (OUTPATIENT)
Dept: GASTROENTEROLOGY | Facility: CLINIC | Age: 42
End: 2022-12-16

## 2022-12-16 NOTE — TELEPHONE ENCOUNTER
Screening Questions  BLUE  KIND OF PREP RED  LOCATION [review exclusion criteria] GREEN  SEDATION TYPE        Y Are you active on mychart?       RICHARD BELLO Ordering/Referring Provider?        HP What type of coverage do you have?      N Have you had a positive covid test in the last 14 days?     32.4 1. BMI  [BMI 40+ - review exclusion criteria]    Y  2. Are you able to give consent for your medical care? [IF NO,RN REVIEW]        N  3. Are you taking any prescription pain medications on a routine schedule?        3a. EXTENDED PREP What kind of prescription?     N 4. Do you have any chemical dependencies such as alcohol, street drugs, or methadone?    N 5. Do you have any history of post-traumatic stress syndrome, severe anxiety or history of psychosis?      **If yes 3- 5 , please schedule with MAC sedation.**          IF YES TO ANY 6 - 10 - HOSPITAL SETTING ONLY.     N 6.   Do you need assistance transferring?     N 7.   Have you had a heart or lung transplant?    N 8.   Are you currently on dialysis?   N 9.   Do you use daily home oxygen?   N 10. Do you take nitroglycerin?   10a.  If yes, how often?     11. [FEMALES]   Are you currently pregnant?    11a.  If yes, how many weeks? [ Greater than 12 weeks, OR NEEDED]    N 12. Do you have Pulmonary Hypertension? *NEED PAC APPT AT [facility]*     N 13. [review exclusion criteria]  Do you have any implantable devices in your body (pacemaker, defib, LVAD)?    N 14. In the past 6 months, have you had any heart related issues including cardiomyopathy or heart attack?     14a.  If yes, did it require cardiac stenting if so when?     N 15. Have you had a stroke or Transient ischemic attack (TIA - aka  mini stroke ) within 6 months?      N 16. Do you have mod to severe Obstructive Sleep Apnea?  [Hospital only - Ok at [place]]    N 17. Do you have SEVERE AND UNCONTROLLED asthma? *NEED PAC APPT AT [facility]*     N 18. Are you currently taking any blood thinners?     18a. If  "yes, inform patient to \"follow up w/ ordering provider for bridging instructions.\"    N 19. Do you take the medication Phentermine?    19a. If yes, \"Hold for 7 days before procedure.  Please consult your prescribing provider if you have questions about holding this medication.\"     N  20. Do you have chronic kidney disease?      N  21. Do you have a diagnosis of diabetes?     N  22. On a regular basis do you go 3-5 days between bowel movements?      23. Preferred LOCAL Pharmacy for Pre Prescription    [ LIST ONLY ONE PHARMACY]        [facility] #[id number] [address] AT [facility] OF  [address]      - CLOSING REMINDERS -    Informed patient they will need an adult    Cannot take any type of public or medical transportation alone    Conscious Sedation- Needs  for 6 hours after the procedure       MAC/General-Needs  for 24 hours after procedure    Pre-Procedure Covid test to be completed [[facility] PCR Testing Required]    Confirmed Nurse will call to complete assessment       - SCHEDULING DETAILS -  NO Hospital Setting Required? If yes, what is the exclusion?:    NELDA  Surgeon    3/6/2023  Date of Procedure  Lower Endoscopy [Colonoscopy]  Type of Procedure Scheduled  [facility]   STANDARD GOLYTELY-If you answer yes to questions #8, #20, #21Which Colonoscopy Prep was Sent?     MAC Sedation Type     NO PAC / Pre-op Required                 "

## 2023-03-05 ENCOUNTER — ANESTHESIA EVENT (OUTPATIENT)
Dept: GASTROENTEROLOGY | Facility: CLINIC | Age: 43
End: 2023-03-05
Payer: COMMERCIAL

## 2023-03-05 ASSESSMENT — LIFESTYLE VARIABLES: TOBACCO_USE: 1

## 2023-03-06 ENCOUNTER — HOSPITAL ENCOUNTER (OUTPATIENT)
Facility: CLINIC | Age: 43
Discharge: HOME | End: 2023-03-06
Attending: SPECIALIST | Admitting: SPECIALIST
Payer: COMMERCIAL

## 2023-03-06 ENCOUNTER — ANESTHESIA (OUTPATIENT)
Dept: GASTROENTEROLOGY | Facility: CLINIC | Age: 43
End: 2023-03-06
Payer: COMMERCIAL

## 2023-03-06 VITALS
TEMPERATURE: 97.7 F | RESPIRATION RATE: 16 BRPM | OXYGEN SATURATION: 97 % | HEIGHT: 72 IN | HEART RATE: 66 BPM | SYSTOLIC BLOOD PRESSURE: 121 MMHG | WEIGHT: 242 LBS | BODY MASS INDEX: 32.78 KG/M2 | DIASTOLIC BLOOD PRESSURE: 83 MMHG

## 2023-03-06 PROBLEM — Z86.0100 HISTORY OF COLONIC POLYPS: Status: ACTIVE | Noted: 2023-03-06

## 2023-03-06 PROBLEM — K57.30 DIVERTICULOSIS OF LARGE INTESTINE: Status: ACTIVE | Noted: 2023-03-06

## 2023-03-06 LAB — COLONOSCOPY: NORMAL

## 2023-03-06 PROCEDURE — 45385 COLONOSCOPY W/LESION REMOVAL: CPT | Mod: PT | Performed by: SPECIALIST

## 2023-03-06 PROCEDURE — 88305 TISSUE EXAM BY PATHOLOGIST: CPT | Mod: 26 | Performed by: PATHOLOGY

## 2023-03-06 PROCEDURE — 45385 COLONOSCOPY W/LESION REMOVAL: CPT | Performed by: SPECIALIST

## 2023-03-06 PROCEDURE — 258N000003 HC RX IP 258 OP 636: Performed by: NURSE ANESTHETIST, CERTIFIED REGISTERED

## 2023-03-06 PROCEDURE — 250N000011 HC RX IP 250 OP 636: Performed by: NURSE ANESTHETIST, CERTIFIED REGISTERED

## 2023-03-06 PROCEDURE — 370N000017 HC ANESTHESIA TECHNICAL FEE, PER MIN: Performed by: SPECIALIST

## 2023-03-06 PROCEDURE — 88305 TISSUE EXAM BY PATHOLOGIST: CPT | Mod: TC | Performed by: SPECIALIST

## 2023-03-06 PROCEDURE — 250N000009 HC RX 250: Performed by: NURSE ANESTHETIST, CERTIFIED REGISTERED

## 2023-03-06 PROCEDURE — 250N000009 HC RX 250: Performed by: SPECIALIST

## 2023-03-06 RX ORDER — SODIUM CHLORIDE, SODIUM LACTATE, POTASSIUM CHLORIDE, CALCIUM CHLORIDE 600; 310; 30; 20 MG/100ML; MG/100ML; MG/100ML; MG/100ML
INJECTION, SOLUTION INTRAVENOUS CONTINUOUS
Status: DISCONTINUED | OUTPATIENT
Start: 2023-03-06 | End: 2023-03-06 | Stop reason: HOSPADM

## 2023-03-06 RX ORDER — PROPOFOL 10 MG/ML
INJECTION, EMULSION INTRAVENOUS PRN
Status: DISCONTINUED | OUTPATIENT
Start: 2023-03-06 | End: 2023-03-06

## 2023-03-06 RX ORDER — LIDOCAINE 40 MG/G
CREAM TOPICAL
Status: DISCONTINUED | OUTPATIENT
Start: 2023-03-06 | End: 2023-03-06 | Stop reason: HOSPADM

## 2023-03-06 RX ORDER — PROPOFOL 10 MG/ML
INJECTION, EMULSION INTRAVENOUS CONTINUOUS PRN
Status: DISCONTINUED | OUTPATIENT
Start: 2023-03-06 | End: 2023-03-06

## 2023-03-06 RX ORDER — LIDOCAINE HYDROCHLORIDE 20 MG/ML
INJECTION, SOLUTION INFILTRATION; PERINEURAL PRN
Status: DISCONTINUED | OUTPATIENT
Start: 2023-03-06 | End: 2023-03-06

## 2023-03-06 RX ORDER — SODIUM CHLORIDE, SODIUM LACTATE, POTASSIUM CHLORIDE, CALCIUM CHLORIDE 600; 310; 30; 20 MG/100ML; MG/100ML; MG/100ML; MG/100ML
INJECTION, SOLUTION INTRAVENOUS CONTINUOUS PRN
Status: DISCONTINUED | OUTPATIENT
Start: 2023-03-06 | End: 2023-03-06

## 2023-03-06 RX ADMIN — LIDOCAINE HYDROCHLORIDE 50 MG: 20 INJECTION, SOLUTION INFILTRATION; PERINEURAL at 08:20

## 2023-03-06 RX ADMIN — SODIUM CHLORIDE, POTASSIUM CHLORIDE, SODIUM LACTATE AND CALCIUM CHLORIDE: 600; 310; 30; 20 INJECTION, SOLUTION INTRAVENOUS at 07:32

## 2023-03-06 RX ADMIN — PROPOFOL 50 MG: 10 INJECTION, EMULSION INTRAVENOUS at 08:21

## 2023-03-06 RX ADMIN — LIDOCAINE HYDROCHLORIDE 0.1 ML: 10 INJECTION, SOLUTION EPIDURAL; INFILTRATION; INTRACAUDAL; PERINEURAL at 07:31

## 2023-03-06 RX ADMIN — SODIUM CHLORIDE, POTASSIUM CHLORIDE, SODIUM LACTATE AND CALCIUM CHLORIDE: 600; 310; 30; 20 INJECTION, SOLUTION INTRAVENOUS at 08:20

## 2023-03-06 RX ADMIN — PROPOFOL 150 MCG/KG/MIN: 10 INJECTION, EMULSION INTRAVENOUS at 08:21

## 2023-03-06 ASSESSMENT — ACTIVITIES OF DAILY LIVING (ADL)
ADLS_ACUITY_SCORE: 35
ADLS_ACUITY_SCORE: 35

## 2023-03-06 NOTE — H&P
[facility] History and Physical    Henry Aaron MRN# [id number]   Age: 42 year old YOB: 1980     Date of Admission:  (Not on file)    Home clinic: [facility]  Primary care provider: [facility]          Impression and Plan:   Impression:   Family history of colon cancer [Z80.0]  Maternal uncle with colon ca.  No prior colonsocopy      Plan:   Proceed to  Colonoscopy as planned.  The procedure, risks(bleeding, perforation), benefits and alternatives were discussed and the patient agrees to proceed. Cleared for Anesthesia             Chief Complaint:   Family history of colon cancer [Z80.0]    History is obtained from the patient          History of Present Illness:   This 42 year old male is being seen at this time for evaluation for colonoscopy.  No complaints. Maternal uncle with colon ca.           Past Medical History:     Past Medical History:   Diagnosis Date     History of tobacco use  -  - 2011, was smoking up to 1 ppd     Hyperlipidemia LDL goal <160 10/31/2010            Past Surgical History:     Past Surgical History:   Procedure Laterality Date     HC TYMPANOPLASTY W/O MASTOID, INIT/REV W/O OSS CHAIN RECONST      bilateral     MYRINGOPLASTY  2011    Procedure:MYRINGOPLASTY; fat graft; Surgeon:HONORIO SIDDIQUI; Location:[facility]     TYMPANOPLASTY  Dec 2009    left ear            Social History:     Social History     Tobacco Use     Smoking status: Former     Types: Cigarettes     Smokeless tobacco: Never     Tobacco comments:     0-5 cigs/day since , on and off (has quit for up to three years at one point)   Substance Use Topics     Alcohol use: No     Comment: Has not had any alcoholic beverages since             Family History:     Family History   Problem Relation Age of Onset     Heart Disease Maternal Grandfather          from heart failure at 75 y.o.     Cardiovascular Maternal Grandfather          heart failure     Neurologic Disorder Mother         MS, 40 y.o. at onset     Cancer Sister         breast cancer     Breast Cancer Sister      Asthma No family hx of      C.A.D. No family hx of      Diabetes No family hx of      Hypertension No family hx of      Cerebrovascular Disease No family hx of      Cancer - colorectal No family hx of      Prostate Cancer No family hx of      Arthritis No family hx of      Blood Disease No family hx of      Alzheimer Disease No family hx of      Circulatory No family hx of      Eye Disorder No family hx of      Gastrointestinal Disease No family hx of      Genitourinary Problems No family hx of      Lipids No family hx of      Thyroid Disease No family hx of      Respiratory No family hx of             Immunizations:     VACCINE/DOSE   Diptheria   DPT   DTAP   HBIG   Hepatitis A   Hepatitis B   HIB   Influenza   Measles   Meningococcal   MMR   Mumps   Pneumococcal   Polio   Rubella   Small Pox   TDAP   Varicella   Zoster            Allergies:     Allergies   Allergen Reactions     Augmentin Rash     Unclear if this true allergy or not. Rash appears like drug sensitivity rash but patient also [place] same [place] starting augmentin. Advise avoidance if possible.      Sulfa Drugs             Medications:     No current facility-administered medications for this encounter.     Current Outpatient Medications   Medication Sig     bisacodyl (DULCOLAX) 5 MG EC tablet Take 2 tablets at 3 pm the day before your procedure. If your procedure is before 11 am, take 2 additional tablets at 11 pm. If your procedure is after 11 am, take 2 additional tablets at 6 am. For additional instructions refer to your colonoscopy prep instructions.     polyethylene glycol (GOLYTELY) 236 g suspension The night before the exam at 6 pm drink an 8-ounce glass every 15 minutes until the jug is half empty. If you arrive before 11 AM: Drink the other half of the  Golytely jug at 11 PM night before procedure. If you arrive after 11 AM: Drink the other half of the Golytely jug at 6 AM day of procedure. For additional instructions refer to your colonoscopy prep instructions.             Review of Systems:   The review of systems was positive for the following findings.  None.  The remainder of the review of systems was unremarkable.          Physical Exam:   All vitals have been reviewed  There were no vitals taken for this visit.  No intake or output data in the 24 hours ending 03/05/23 2030  SHEENT examination revealed NC/aT, EOMI.  Examination of the chest revealed CTA.  Examination of the heart revealed RRR.  Examination of the abdomen revealed soft, nontender.  The neuromuscular examination was NL.          Data:   All laboratory data reviewed  No results found for any visits on 03/06/23.  -     Thomas Chaparro MD, FACS

## 2023-03-06 NOTE — ANESTHESIA CARE TRANSFER NOTE
Patient: Henry Aaron    Procedure: Procedure(s):  COLONOSCOPY, FLEXIBLE, WITH LESION REMOVAL USING SNARE       Diagnosis: Family history of colon cancer [Z80.0]  Diagnosis Additional Information: No value filed.    Anesthesia Type:   MAC     Note:    Oropharynx: oropharynx clear of all foreign objects and spontaneously breathing  Level of Consciousness: drowsy  Oxygen Supplementation: room air    Independent Airway: airway patency satisfactory and stable  Dentition: dentition unchanged  Vital Signs Stable: post-procedure vital signs reviewed and stable  Report to RN Given: handoff report given  Patient transferred to: Phase II    Handoff Report: Identifed the Patient, Identified the Reponsible Provider, Reviewed the pertinent medical history, Discussed the surgical course, Reviewed Intra-OP anesthesia mangement and issues during anesthesia, Set expectations for post-procedure period and Allowed opportunity for questions and acknowledgement of understanding      Vitals:  Vitals Value Taken Time   /83 03/06/23 0850   Temp     Pulse 68 03/06/23 0845   Resp     SpO2 97 % 03/06/23 0850   Vitals shown include unvalidated device data.    Electronically Signed By: GRACE Arevalo CRNA  March 6, 2023  8:51 AM

## 2023-03-06 NOTE — ANESTHESIA PREPROCEDURE EVALUATION
Anesthesia Pre-Procedure Evaluation    Patient: Henry Aaron   MRN: [id number] : 1980        Procedure : Procedure(s):  COLONOSCOPY          Past Medical History:   Diagnosis Date     History of tobacco use  -  - 2011, was smoking up to 1 ppd     Hyperlipidemia LDL goal <160 10/31/2010      Past Surgical History:   Procedure Laterality Date     HC TYMPANOPLASTY W/O MASTOID, INIT/REV W/O OSS CHAIN RECONST      bilateral     MYRINGOPLASTY  2011    Procedure:MYRINGOPLASTY; fat graft; Surgeon:HONORIO SIDDIQUI; Location:[facility]     TYMPANOPLASTY  Dec 2009    left ear      Allergies   Allergen Reactions     Augmentin Rash     Unclear if this true allergy or not. Rash appears like drug sensitivity rash but patient also [place] as starting augmentin. Advise avoidance if possible.      Sulfa Drugs       Social History     Tobacco Use     Smoking status: Former     Types: Cigarettes     Smokeless tobacco: Never     Tobacco comments:     0-5 cigs/day since , on and off (has quit for up to three years at one point)   Substance Use Topics     Alcohol use: No     Comment: Has not had any alcoholic beverages since       Wt Readings from Last 1 Encounters:   22 109.8 kg (242 lb)        Anesthesia Evaluation            ROS/MED HX  ENT/Pulmonary:     (+) tobacco use, Past use,     Neurologic:       Cardiovascular:     (+) Dyslipidemia -----    METS/Exercise Tolerance:     Hematologic:       Musculoskeletal:       GI/Hepatic: Comment: Abnormal liver enzymes  Family H/O colon cancer       Renal/Genitourinary:       Endo:     (+) Obesity,     Psychiatric/Substance Use:       Infectious Disease:       Malignancy:       Other:            Physical Exam    Airway  airway exam normal      Mallampati: II   TM distance: > 3 FB   Neck ROM: full   Mouth opening: > 3 cm    Respiratory Devices and Support         Dental           Cardiovascular    cardiovascular exam normal       Rhythm and rate: regular and normal     Pulmonary   pulmonary exam normal        breath sounds clear to auscultation           OUTSIDE LABS:  CBC:   Lab Results   Component Value Date    WBC 6.0 12/13/2022    WBC 4.6 03/09/2018    HGB 14.3 12/13/2022    HGB 15.7 03/09/2018    HCT 43.8 12/13/2022    HCT 47.0 03/09/2018     12/13/2022     03/09/2018     BMP:   Lab Results   Component Value Date     12/13/2022     03/09/2018    POTASSIUM 4.4 12/13/2022    POTASSIUM 4.5 03/09/2018    CHLORIDE 111 (H) 12/13/2022    CHLORIDE 105 03/09/2018    CO2 27 12/13/2022    CO2 28 03/09/2018    BUN 19 12/13/2022    BUN 21 03/09/2018    CR 0.87 12/13/2022    CR 0.79 03/09/2018    GLC 91 12/13/2022    GLC 87 03/09/2018     COAGS: No results found for: PTT, INR, FIBR  POC: No results found for: BGM, HCG, HCGS  HEPATIC:   Lab Results   Component Value Date    ALBUMIN 3.8 12/13/2022    PROTTOTAL 7.4 12/13/2022    ALT 78 (H) 12/13/2022    AST 24 12/13/2022     (H) 03/09/2018    ALKPHOS 83 12/13/2022    BILITOTAL 0.4 12/13/2022     OTHER:   Lab Results   Component Value Date    A1C 5.2 02/05/2018    CHIN 8.8 12/13/2022       Anesthesia Plan    ASA Status:  2   NPO Status:  NPO Appropriate    Anesthesia Type: MAC.     - Reason for MAC: straight local not clinically adequate   Induction: Intravenous, Propofol.   Maintenance: TIVA.        Consents    Anesthesia Plan(s) and associated risks, benefits, and realistic alternatives discussed. Questions answered and patient/representative(s) expressed understanding.    - Discussed:     - Discussed with:  Patient      - Extended Intubation/Ventilatory Support Discussed: No.      - Patient is DNR/DNI Status: No    Use of blood products discussed: No .     Postoperative Care    Pain management: Oral pain medications.   PONV prophylaxis: Background Propofol Infusion     Comments:    Other Comments: The risks and benefits of anesthesia,  and the alternatives where applicable, have been discussed with the patient, and they wish to proceed.            GRACE Arevalo CRNA

## 2023-03-06 NOTE — DISCHARGE INSTRUCTIONS
[facility]    Home Care Following Endoscopy          Activity:  You have just undergone an endoscopic procedure usually performed with conscious sedation.  Do not work or operate machinery (including a car) for at least 12 hours.    I encourage you to walk and attempt to pass this air as soon as possible.    Diet:  Return to the diet you were on before your procedure but eat lightly for the first 12-24 hours.  Drink plenty of water.  Resume any regular medications unless otherwise advised by your physician.  Please begin any new medication prescribed as a result of your procedure as directed by your physician.   If you had any biopsy or polyp removed please refrain from aspirin or aspirin products for 2 days.  If on Coumadin please restart as instructed by your physician.   Pain:  You may take Tylenol as needed for pain.  Expected Recovery:  You can expect some mild abdominal fullness and/or discomfort due to the air used to inflate your intestinal tract. It is also normal to have a mild sore throat after upper endoscopy.    Call Your Physician if You Have:  After Colonoscopy:  Worsening persisting abdominal pain which is worse with activity.  Fevers (>101 degrees F), chills or shakes.  Passage of continued blood with bowel movements.   Any questions or concerns about your recovery, please call [phone] or after hours [phone] Nurse Advice Line.    Follow-up Care:  IF YOU HAD polyps/biopsy tissue sample(s) removed.  The polyps/biopsy tissue sample(s) will be sent to pathology.  You should receive letter in your My Chart with your results within 1-2 weeks. If you do not participate in My Chart a physical letter will come in the mail in 2-3 weeks.  Please call if you have not received a notification of your results.  If asked to return to clinic please make an appointment 1 week after your procedure.  Call [phone].

## 2023-03-06 NOTE — ANESTHESIA POSTPROCEDURE EVALUATION
Patient: Henry Aaron    Procedure: Procedure(s):  COLONOSCOPY, FLEXIBLE, WITH LESION REMOVAL USING SNARE       Anesthesia Type:  MAC    Note:  Disposition: Outpatient   Postop Pain Control: Uneventful            Sign Out: Well controlled pain   PONV: No   Neuro/Psych: Uneventful            Sign Out: Acceptable/Baseline neuro status   Airway/Respiratory: Uneventful            Sign Out: Acceptable/Baseline resp. status   CV/Hemodynamics: Uneventful            Sign Out: Acceptable CV status   Other NRE: NONE   DID A NON-ROUTINE EVENT OCCUR? No    Event details/Postop Comments:  Pt was happy with anesthesia care.  No complications.  I will follow up with the pt if needed.           Last vitals:  Vitals Value Taken Time   /93 03/06/23 0850   Temp     Pulse 60 03/06/23 0900   Resp     SpO2 96 % 03/06/23 0903   Vitals shown include unvalidated device data.    Electronically Signed By: GRACE Arevalo CRNA  March 6, 2023  9:04 AM

## 2023-03-07 LAB
PATH REPORT.COMMENTS IMP SPEC: NORMAL
PATH REPORT.COMMENTS IMP SPEC: NORMAL
PATH REPORT.FINAL DX SPEC: NORMAL
PATH REPORT.GROSS SPEC: NORMAL
PATH REPORT.MICROSCOPIC SPEC OTHER STN: NORMAL
PATH REPORT.RELEVANT HX SPEC: NORMAL
PHOTO IMAGE: NORMAL

## 2024-01-25 ENCOUNTER — PATIENT OUTREACH (OUTPATIENT)
Dept: GASTROENTEROLOGY | Facility: CLINIC | Age: 44
End: 2024-01-25
Payer: COMMERCIAL

## 2025-08-03 ENCOUNTER — HEALTH MAINTENANCE LETTER (OUTPATIENT)
Age: 45
End: 2025-08-03

## (undated) DEVICE — TUBING SUCTION 6"X3/16" N56A

## (undated) DEVICE — SOL WATER IRRIG 1000ML BOTTLE 2F7114

## (undated) DEVICE — KIT ENDO TURNOVER/PROCEDURE CARRY-ON 101822